# Patient Record
Sex: MALE | Race: WHITE | NOT HISPANIC OR LATINO | Employment: OTHER | URBAN - METROPOLITAN AREA
[De-identification: names, ages, dates, MRNs, and addresses within clinical notes are randomized per-mention and may not be internally consistent; named-entity substitution may affect disease eponyms.]

---

## 2014-09-23 LAB — CRC RECOMMENDATION EXT: NORMAL

## 2017-03-27 ENCOUNTER — HISTORICAL (OUTPATIENT)
Dept: ADMINISTRATIVE | Facility: HOSPITAL | Age: 67
End: 2017-03-27

## 2017-10-09 ENCOUNTER — TELEPHONE (OUTPATIENT)
Dept: UROLOGY | Facility: CLINIC | Age: 67
End: 2017-10-09

## 2017-10-09 NOTE — TELEPHONE ENCOUNTER
I spoke with patient, who agreed to new apt date and time, will bring his records and cd of his mri/ultrasound.

## 2017-10-09 NOTE — TELEPHONE ENCOUNTER
----- Message from Maxine Motta RN sent at 10/5/2017  1:15 PM CDT -----  Contact: pt#[phone]      ----- Message -----  From: Debbie Prado  Sent: 10/5/2017   1:04 PM  To: Italo ELLIOTT Staff    Pt wants to know if he can come in sooner for consult for prostate cancer

## 2017-10-12 ENCOUNTER — OFFICE VISIT (OUTPATIENT)
Dept: UROLOGY | Facility: CLINIC | Age: 67
End: 2017-10-12
Payer: MEDICARE

## 2017-10-12 VITALS
RESPIRATION RATE: 15 BRPM | HEIGHT: 69 IN | SYSTOLIC BLOOD PRESSURE: 131 MMHG | WEIGHT: 185 LBS | DIASTOLIC BLOOD PRESSURE: 65 MMHG | BODY MASS INDEX: 27.4 KG/M2 | HEART RATE: 65 BPM

## 2017-10-12 DIAGNOSIS — N48.6 PEYRONIE'S DISEASE: ICD-10-CM

## 2017-10-12 DIAGNOSIS — C61 PROSTATE CANCER: ICD-10-CM

## 2017-10-12 PROCEDURE — 99205 OFFICE O/P NEW HI 60 MIN: CPT | Mod: S$PBB,,, | Performed by: UROLOGY

## 2017-10-12 PROCEDURE — 99213 OFFICE O/P EST LOW 20 MIN: CPT | Mod: PBBFAC | Performed by: UROLOGY

## 2017-10-12 PROCEDURE — 99999 PR PBB SHADOW E&M-EST. PATIENT-LVL III: CPT | Mod: PBBFAC,,, | Performed by: UROLOGY

## 2017-10-12 RX ORDER — NIACINAMIDE 500 MG
500 TABLET ORAL
COMMUNITY
End: 2022-08-23

## 2017-10-12 RX ORDER — DUTASTERIDE AND TAMSULOSIN HYDROCHLORIDE CAPSULES .5; .4 MG/1; MG/1
CAPSULE ORAL
COMMUNITY
Start: 2017-10-11 | End: 2022-08-23

## 2017-10-12 RX ORDER — AMOXICILLIN 500 MG
2 CAPSULE ORAL DAILY
COMMUNITY
End: 2022-08-23

## 2017-10-12 RX ORDER — DOXYCYCLINE 100 MG/1
100 CAPSULE ORAL
COMMUNITY
End: 2022-08-23

## 2017-10-12 NOTE — LETTER
October 12, 2017      Rip Yen MD  [address] [facility] [address]           [facility] [place] [id number]  [place] [address]  Phone: [phone]          Patient: Matthew Mejia   MR Number: [id number]   YOB: 1950   Date of Visit: 10/12/2017       Dear Dr. Rip Yen:    Thank you for referring Matthew Mejia to me for evaluation. Attached you will find relevant portions of my assessment and plan of care.    If you have questions, please do not hesitate to call me. I look forward to following Matthew Mejia along with you.    Sincerely,    Yao Puckett MD    Enclosure  CC:  No Recipients    If you would like to receive this communication electronically, please contact [email or web address] or [phone] to request more information on [employer] Link access.    For providers and/or their staff who would like to refer a patient to [facility], please contact us through our one-stop-shop provider referral line, [facility], at [phone].    If you feel you have received this communication in error or would no longer like to receive these types of communications, please e-mail [email or web address]

## 2017-10-12 NOTE — PROGRESS NOTES
Clinic Note  10/12/2017      Subjective:         Chief Complaint:   HPI  Matthew Mejia is a 67 y.o. male recently diagnosed with prostate cancer. Consult from Dr. Yen.  Here with his wife Liza.  Retired from technology job.  Has some LUTS better on flomax and avodart, continent.  Potent, history of peyronies.      PSAi- 7.5 ( corrected 15)  Volume- 36 ccs (TRUS), 38 ccs (MRI)  Stage T3  Biopsy (9/21/2017) Right 15/25 positive [place] 8 10-30% cores positive; left 8/28 positive Ugo 8 and 4+3 8/28 positive.  MRI PIRADS 5 midline base/mid, positive EPE, positive SV, negative nodes.  EV score- 8  High Risk        Past Medical History:   Diagnosis Date    BPH (benign prostatic hyperplasia) 1994    Urinary tract infection      Family History   Problem Relation Age of Onset    Cancer Father     Cancer Mother     Heart disease Mother     Cancer Paternal Grandfather      Social History     Social History    Marital status:      Spouse name: Liza    Number of children: 2    Years of education: N/A     Occupational History    Not on file.     Social History Main Topics    Smoking status: Never Smoker    Smokeless tobacco: Never Used    Alcohol use 1.8 oz/week     3 Cans of beer per week    Drug use: No    Sexual activity: Yes     Partners: Female     Other Topics Concern    Not on file     Social History Narrative    No narrative on file     Past Surgical History:   Procedure Laterality Date    corpal tunnel  03/2017    SQUAMOUS CELL CARCINOMA EXCISION  04/11/2017    TRUS   2008    TRUS BX  09/20/2017     Patient Active Problem List   Diagnosis    Prostate cancer     Review of Systems   Constitutional: Negative for appetite change, chills, fatigue, fever and unexpected weight change.   HENT: Negative for nosebleeds.    Respiratory: Negative for shortness of breath and wheezing.    Cardiovascular: Negative for chest pain, palpitations and leg swelling.   Gastrointestinal: Negative  "for abdominal distention, abdominal pain, constipation, diarrhea, nausea and vomiting.   Genitourinary: Negative for dysuria and hematuria.   Musculoskeletal: Negative for arthralgias and back pain.   Skin: Negative for pallor.   Neurological: Negative for dizziness, seizures and syncope.   Hematological: Negative for adenopathy.   Psychiatric/Behavioral: Negative for dysphoric mood.         Objective:      /65   Pulse 65   Resp 15   Ht 5' 9" (1.753 m)   Wt 83.9 kg (185 lb)   BMI 27.32 kg/m²   Estimated body mass index is 27.32 kg/m² as calculated from the following:    Height as of this encounter: 5' 9" (1.753 m).    Weight as of this encounter: 83.9 kg (185 lb).  Physical Exam   Constitutional: He is oriented to person, place, and time. He appears well-developed and well-nourished. No distress.   HENT:   Head: Atraumatic.   Neck: No tracheal deviation present.   Cardiovascular: Normal rate.    Pulmonary/Chest: Effort normal. No respiratory distress. He has no wheezes.   Abdominal: Soft. Bowel sounds are normal. He exhibits no distension and no mass. There is no tenderness. There is no rebound and no guarding.   Genitourinary: Rectum normal. Rectal exam shows no external hemorrhoid, no internal hemorrhoid, no fissure, no mass and no tenderness.       Genitourinary Comments: T3C on exam, nonfixed   Neurological: He is alert and oriented to person, place, and time.   Skin: Skin is warm and dry. He is not diaphoretic.     Psychiatric: He has a normal mood and affect. His behavior is normal. Judgment and thought content normal.         Assessment and Plan:           Problem List Items Addressed This Visit     Prostate cancer      Other Visit Diagnoses    None.         Follow up:   Today's visit was spent almost entirely on counseling. We reviewed his diagnosis, stage, grade, risk group, and prognosis. We discussed [place] and EV risk stratification We reviewed the [place] nomogram. We discussed " the concept of low risk, moderate risk, and high risk disease. We discussed the different treatment options including active surveillance (as well as the surveillance protocol), prostate brachytherapy, IMRT, IGRT, cryotherapy, HIFU and both open and robotic prostatectomy.We also discussed the advantages, disadvantages, risks and benefits, as well as complications of each option. Regarding radiation therapy we discussed treatment planning, the different techniques, short and long term complications. These included radiation cystitis, radiation proctitis, and impotence. We discussed success, failure, and salvage therapeutic options. We discussed surgical therapy in depth including preoperative preparation, surgical technique (including bladder neck and nerve-sparing techniques), postoperative recuperation and recovery, and short and long term complications including UTI, bleeding, blood clots,catheter dislodgement, etc. We discussed the risks of reoperation, incontinence, impotence, and recurrence. We discussed preop and postop Kegels, post op penile rehab, and treatment options for incontinence and impotence. We discussed rates of cancer free survival and recurrence, as well as salvage therapeutic options. We discussed the possible  indications for adjuvant radiation therapy. I answered questions and addressed concerns.   Recommend bone scan to complete staging. Recommend ADT and XRT W/WO brachy boost.  Discussed fiducial markers, [place] criteria, follow up protocol.  Letter to Dr. Yen. Patient will contact Dr. Yen to schedule bone scan.  I spent 60 minutes with the patient of which more than half was spent in direct consultation with the patient in regards to our treatment and plan.     Yao Puckett

## 2018-05-11 ENCOUNTER — HISTORICAL (OUTPATIENT)
Dept: HEMATOLOGY/ONCOLOGY | Facility: CLINIC | Age: 68
End: 2018-05-11

## 2018-05-11 LAB
ABS NEUT (OLG): 2.89 X10(3)/MCL (ref 2.1–9.2)
ALBUMIN SERPL-MCNC: 4 GM/DL (ref 3.4–5)
ALBUMIN/GLOB SERPL: 1.7 RATIO (ref 1.1–2)
ALP SERPL-CCNC: 78 UNIT/L (ref 50–136)
ALT SERPL-CCNC: 63 UNIT/L (ref 12–78)
AST SERPL-CCNC: 31 UNIT/L (ref 15–37)
BASOPHILS # BLD AUTO: 0 X10(3)/MCL (ref 0–0.2)
BASOPHILS NFR BLD AUTO: 0.3 %
BILIRUB SERPL-MCNC: 0.6 MG/DL (ref 0.2–1)
BILIRUBIN DIRECT+TOT PNL SERPL-MCNC: 0.1 MG/DL (ref 0–0.5)
BILIRUBIN DIRECT+TOT PNL SERPL-MCNC: 0.5 MG/DL (ref 0–0.8)
BUN SERPL-MCNC: 21 MG/DL (ref 7–18)
CALCIUM SERPL-MCNC: 9.3 MG/DL (ref 8.5–10.1)
CHLORIDE SERPL-SCNC: 110 MMOL/L (ref 98–107)
CO2 SERPL-SCNC: 29 MMOL/L (ref 21–32)
CREAT SERPL-MCNC: 0.79 MG/DL (ref 0.7–1.3)
EOSINOPHIL # BLD AUTO: 0 X10(3)/MCL (ref 0–0.9)
EOSINOPHIL NFR BLD AUTO: 0.8 %
ERYTHROCYTE [DISTWIDTH] IN BLOOD BY AUTOMATED COUNT: 12.3 % (ref 11.5–17)
GLOBULIN SER-MCNC: 2.3 GM/DL (ref 2.4–3.5)
GLUCOSE SERPL-MCNC: 85 MG/DL (ref 74–106)
HCT VFR BLD AUTO: 37.9 % (ref 42–52)
HGB BLD-MCNC: 12.1 GM/DL (ref 14–18)
LYMPHOCYTES # BLD AUTO: 0.5 X10(3)/MCL (ref 0.6–4.6)
LYMPHOCYTES NFR BLD AUTO: 12.2 %
MCH RBC QN AUTO: 32.1 PG (ref 27–31)
MCHC RBC AUTO-ENTMCNC: 31.9 GM/DL (ref 33–36)
MCV RBC AUTO: 100.5 FL (ref 80–94)
MONOCYTES # BLD AUTO: 0.5 X10(3)/MCL (ref 0.1–1.3)
MONOCYTES NFR BLD AUTO: 13.7 %
NEUTROPHILS # BLD AUTO: 2.9 X10(3)/MCL (ref 2.1–9.2)
NEUTROPHILS NFR BLD AUTO: 73 %
PLATELET # BLD AUTO: 177 X10(3)/MCL (ref 130–400)
PMV BLD AUTO: 11.6 FL (ref 9.4–12.4)
POTASSIUM SERPL-SCNC: 4.3 MMOL/L (ref 3.5–5.1)
PROT SERPL-MCNC: 6.3 GM/DL (ref 6.4–8.2)
RBC # BLD AUTO: 3.77 X10(6)/MCL (ref 4.7–6.1)
SODIUM SERPL-SCNC: 145 MMOL/L (ref 136–145)
WBC # SPEC AUTO: 4 X10(3)/MCL (ref 4.5–11.5)

## 2018-08-16 ENCOUNTER — HISTORICAL (OUTPATIENT)
Dept: ADMINISTRATIVE | Facility: HOSPITAL | Age: 68
End: 2018-08-16

## 2018-08-16 LAB
ABS NEUT (OLG): 2.46 X10(3)/MCL (ref 2.1–9.2)
ALBUMIN SERPL-MCNC: 3.7 GM/DL (ref 3.4–5)
ALBUMIN/GLOB SERPL: 1.5 {RATIO}
ALP SERPL-CCNC: 71 UNIT/L (ref 50–136)
ALT SERPL-CCNC: 123 UNIT/L (ref 12–78)
AST SERPL-CCNC: 63 UNIT/L (ref 15–37)
BASOPHILS # BLD AUTO: 0 X10(3)/MCL (ref 0–0.2)
BASOPHILS NFR BLD AUTO: 0.3 %
BILIRUB SERPL-MCNC: 0.7 MG/DL (ref 0.2–1)
BILIRUBIN DIRECT+TOT PNL SERPL-MCNC: 0.2 MG/DL (ref 0–0.2)
BILIRUBIN DIRECT+TOT PNL SERPL-MCNC: 0.5 MG/DL (ref 0–0.8)
BUN SERPL-MCNC: 20 MG/DL (ref 7–18)
CALCIUM SERPL-MCNC: 8.8 MG/DL (ref 8.5–10.1)
CHLORIDE SERPL-SCNC: 109 MMOL/L (ref 98–107)
CO2 SERPL-SCNC: 26 MMOL/L (ref 21–32)
CREAT SERPL-MCNC: 0.72 MG/DL (ref 0.7–1.3)
EOSINOPHIL # BLD AUTO: 0 X10(3)/MCL (ref 0–0.9)
EOSINOPHIL NFR BLD AUTO: 0.3 %
ERYTHROCYTE [DISTWIDTH] IN BLOOD BY AUTOMATED COUNT: 14 % (ref 11.5–17)
GLOBULIN SER-MCNC: 2.5 GM/DL (ref 2.4–3.5)
GLUCOSE SERPL-MCNC: 81 MG/DL (ref 74–106)
HCT VFR BLD AUTO: 35.6 % (ref 42–52)
HGB BLD-MCNC: 11.4 GM/DL (ref 14–18)
LYMPHOCYTES # BLD AUTO: 0.6 X10(3)/MCL (ref 0.6–4.6)
LYMPHOCYTES NFR BLD AUTO: 17 %
MCH RBC QN AUTO: 31 PG (ref 27–31)
MCHC RBC AUTO-ENTMCNC: 32 GM/DL (ref 33–36)
MCV RBC AUTO: 96.7 FL (ref 80–94)
MONOCYTES # BLD AUTO: 0.6 X10(3)/MCL (ref 0.1–1.3)
MONOCYTES NFR BLD AUTO: 15.1 %
NEUTROPHILS # BLD AUTO: 2.5 X10(3)/MCL (ref 2.1–9.2)
NEUTROPHILS NFR BLD AUTO: 67.3 %
PLATELET # BLD AUTO: 173 X10(3)/MCL (ref 130–400)
PMV BLD AUTO: 11.1 FL (ref 9.4–12.4)
POTASSIUM SERPL-SCNC: 3.8 MMOL/L (ref 3.5–5.1)
PROT SERPL-MCNC: 6.2 GM/DL (ref 6.4–8.2)
PSA SERPL-MCNC: <0.01 NG/ML (ref 0–4)
RBC # BLD AUTO: 3.68 X10(6)/MCL (ref 4.7–6.1)
SODIUM SERPL-SCNC: 144 MMOL/L (ref 136–145)
WBC # SPEC AUTO: 3.6 X10(3)/MCL (ref 4.5–11.5)

## 2018-09-13 ENCOUNTER — HISTORICAL (OUTPATIENT)
Dept: ADMINISTRATIVE | Facility: HOSPITAL | Age: 68
End: 2018-09-13

## 2018-09-13 LAB — TSH SERPL-ACNC: 1.63 MIU/L (ref 0.36–3.74)

## 2018-11-15 ENCOUNTER — HISTORICAL (OUTPATIENT)
Dept: HEMATOLOGY/ONCOLOGY | Facility: CLINIC | Age: 68
End: 2018-11-15

## 2018-11-15 LAB
ABS NEUT (OLG): 2.94 X10(3)/MCL (ref 2.1–9.2)
ALBUMIN SERPL-MCNC: 3.6 GM/DL (ref 3.4–5)
ALBUMIN/GLOB SERPL: 1.4 {RATIO}
ALP SERPL-CCNC: 94 UNIT/L (ref 50–136)
ALT SERPL-CCNC: 37 UNIT/L (ref 12–78)
AST SERPL-CCNC: 20 UNIT/L (ref 15–37)
BASOPHILS # BLD AUTO: 0 X10(3)/MCL (ref 0–0.2)
BASOPHILS NFR BLD AUTO: 0.5 %
BILIRUB SERPL-MCNC: 0.5 MG/DL (ref 0.2–1)
BILIRUBIN DIRECT+TOT PNL SERPL-MCNC: 0.1 MG/DL (ref 0–0.2)
BILIRUBIN DIRECT+TOT PNL SERPL-MCNC: 0.4 MG/DL (ref 0–0.8)
BUN SERPL-MCNC: 19 MG/DL (ref 7–18)
CALCIUM SERPL-MCNC: 8.7 MG/DL (ref 8.5–10.1)
CHLORIDE SERPL-SCNC: 109 MMOL/L (ref 98–107)
CO2 SERPL-SCNC: 30 MMOL/L (ref 21–32)
CREAT SERPL-MCNC: 0.8 MG/DL (ref 0.7–1.3)
EOSINOPHIL # BLD AUTO: 0 X10(3)/MCL (ref 0–0.9)
EOSINOPHIL NFR BLD AUTO: 0.5 %
ERYTHROCYTE [DISTWIDTH] IN BLOOD BY AUTOMATED COUNT: 12.5 % (ref 11.5–17)
GLOBULIN SER-MCNC: 2.6 GM/DL (ref 2.4–3.5)
GLUCOSE SERPL-MCNC: 78 MG/DL (ref 74–106)
HCT VFR BLD AUTO: 38.8 % (ref 42–52)
HGB BLD-MCNC: 12 GM/DL (ref 14–18)
LYMPHOCYTES # BLD AUTO: 0.6 X10(3)/MCL (ref 0.6–4.6)
LYMPHOCYTES NFR BLD AUTO: 13.5 %
MCH RBC QN AUTO: 30.7 PG (ref 27–31)
MCHC RBC AUTO-ENTMCNC: 30.9 GM/DL (ref 33–36)
MCV RBC AUTO: 99.2 FL (ref 80–94)
MONOCYTES # BLD AUTO: 0.6 X10(3)/MCL (ref 0.1–1.3)
MONOCYTES NFR BLD AUTO: 14.7 %
NEUTROPHILS # BLD AUTO: 2.9 X10(3)/MCL (ref 2.1–9.2)
NEUTROPHILS NFR BLD AUTO: 70.8 %
PLATELET # BLD AUTO: 182 X10(3)/MCL (ref 130–400)
PMV BLD AUTO: 10.7 FL (ref 9.4–12.4)
POTASSIUM SERPL-SCNC: 4 MMOL/L (ref 3.5–5.1)
PROT SERPL-MCNC: 6.2 GM/DL (ref 6.4–8.2)
PSA SERPL-MCNC: <0.01 NG/ML (ref 0–4)
RBC # BLD AUTO: 3.91 X10(6)/MCL (ref 4.7–6.1)
SODIUM SERPL-SCNC: 143 MMOL/L (ref 136–145)
WBC # SPEC AUTO: 4.2 X10(3)/MCL (ref 4.5–11.5)

## 2019-03-15 ENCOUNTER — HISTORICAL (OUTPATIENT)
Dept: HEMATOLOGY/ONCOLOGY | Facility: CLINIC | Age: 69
End: 2019-03-15

## 2019-03-15 LAB
ABS NEUT (OLG): 2.16 X10(3)/MCL (ref 2.1–9.2)
ALBUMIN SERPL-MCNC: 3.7 GM/DL (ref 3.4–5)
ALBUMIN/GLOB SERPL: 1.5 {RATIO}
ALP SERPL-CCNC: 80 UNIT/L (ref 50–136)
ALT SERPL-CCNC: 26 UNIT/L (ref 12–78)
ANISOCYTOSIS BLD QL SMEAR: 0
AST SERPL-CCNC: 17 UNIT/L (ref 15–37)
BASOPHILS # BLD AUTO: 0 X10(3)/MCL (ref 0–0.2)
BASOPHILS NFR BLD AUTO: 0.5 %
BASOPHILS NFR BLD MANUAL: 1 % (ref 0–2)
BILIRUB SERPL-MCNC: 0.6 MG/DL (ref 0.2–1)
BILIRUBIN DIRECT+TOT PNL SERPL-MCNC: 0.1 MG/DL (ref 0–0.2)
BILIRUBIN DIRECT+TOT PNL SERPL-MCNC: 0.5 MG/DL (ref 0–0.8)
BUN SERPL-MCNC: 17 MG/DL (ref 7–18)
CALCIUM SERPL-MCNC: 8.4 MG/DL (ref 8.5–10.1)
CHLORIDE SERPL-SCNC: 107 MMOL/L (ref 98–107)
CO2 SERPL-SCNC: 29 MMOL/L (ref 21–32)
CREAT SERPL-MCNC: 0.83 MG/DL (ref 0.7–1.3)
EOSINOPHIL # BLD AUTO: 0 X10(3)/MCL (ref 0–0.9)
EOSINOPHIL NFR BLD AUTO: 0.5 %
ERYTHROCYTE [DISTWIDTH] IN BLOOD BY AUTOMATED COUNT: 12.9 % (ref 11.5–17)
GLOBULIN SER-MCNC: 2.4 GM/DL (ref 2.4–3.5)
GLUCOSE SERPL-MCNC: 71 MG/DL (ref 74–106)
HCT VFR BLD AUTO: 36.2 % (ref 42–52)
HGB BLD-MCNC: 11.7 GM/DL (ref 14–18)
HYPOCHROMIA BLD QL SMEAR: 0
LYMPHOCYTES # BLD AUTO: 0.8 X10(3)/MCL (ref 0.6–4.6)
LYMPHOCYTES NFR BLD AUTO: 21.7 %
LYMPHOCYTES NFR BLD MANUAL: 17 % (ref 13–40)
MACROCYTES BLD QL SMEAR: 0
MCH RBC QN AUTO: 30.8 PG (ref 27–31)
MCHC RBC AUTO-ENTMCNC: 32.3 GM/DL (ref 33–36)
MCV RBC AUTO: 95.3 FL (ref 80–94)
MICROCYTES BLD QL SMEAR: 0
MONOCYTES # BLD AUTO: 0.7 X10(3)/MCL (ref 0.1–1.3)
MONOCYTES NFR BLD AUTO: 18.5 %
MONOCYTES NFR BLD MANUAL: 19 % (ref 2–11)
NEUTROPHILS # BLD AUTO: 2.2 X10(3)/MCL (ref 2.1–9.2)
NEUTROPHILS NFR BLD AUTO: 58.8 %
NEUTROPHILS NFR BLD MANUAL: 63 % (ref 47–80)
PLATELET # BLD AUTO: 189 X10(3)/MCL (ref 130–400)
PLATELET # BLD EST: NORMAL 10*3/UL
PMV BLD AUTO: 11.1 FL (ref 9.4–12.4)
POIKILOCYTOSIS BLD QL SMEAR: 0
POLYCHROMASIA BLD QL SMEAR: 0
POTASSIUM SERPL-SCNC: 3.6 MMOL/L (ref 3.5–5.1)
PROT SERPL-MCNC: 6.1 GM/DL (ref 6.4–8.2)
PSA SERPL-MCNC: <0.01 NG/ML (ref 0–4)
RBC # BLD AUTO: 3.8 X10(6)/MCL (ref 4.7–6.1)
SODIUM SERPL-SCNC: 143 MMOL/L (ref 136–145)
WBC # SPEC AUTO: 3.7 X10(3)/MCL (ref 4.5–11.5)

## 2019-07-22 ENCOUNTER — HISTORICAL (OUTPATIENT)
Dept: HEMATOLOGY/ONCOLOGY | Facility: CLINIC | Age: 69
End: 2019-07-22

## 2019-07-22 LAB
ABS NEUT (OLG): 1.98 X10(3)/MCL (ref 2.1–9.2)
ALBUMIN SERPL-MCNC: 4 GM/DL (ref 3.4–5)
ALBUMIN/GLOB SERPL: 1.9 {RATIO}
ALP SERPL-CCNC: 93 UNIT/L (ref 50–136)
ALT SERPL-CCNC: 40 UNIT/L (ref 12–78)
AST SERPL-CCNC: 31 UNIT/L (ref 15–37)
BASOPHILS # BLD AUTO: 0 X10(3)/MCL (ref 0–0.2)
BASOPHILS NFR BLD AUTO: 0.3 %
BILIRUB SERPL-MCNC: 0.7 MG/DL (ref 0.2–1)
BILIRUBIN DIRECT+TOT PNL SERPL-MCNC: 0.2 MG/DL (ref 0–0.2)
BILIRUBIN DIRECT+TOT PNL SERPL-MCNC: 0.5 MG/DL (ref 0–0.8)
BUN SERPL-MCNC: 15 MG/DL (ref 7–18)
CALCIUM SERPL-MCNC: 9.1 MG/DL (ref 8.5–10.1)
CHLORIDE SERPL-SCNC: 110 MMOL/L (ref 98–107)
CO2 SERPL-SCNC: 26 MMOL/L (ref 21–32)
CREAT SERPL-MCNC: 0.72 MG/DL (ref 0.7–1.3)
EOSINOPHIL # BLD AUTO: 0 X10(3)/MCL (ref 0–0.9)
EOSINOPHIL NFR BLD AUTO: 1.2 %
EOSINOPHIL NFR BLD MANUAL: 1 % (ref 0–8)
ERYTHROCYTE [DISTWIDTH] IN BLOOD BY AUTOMATED COUNT: 13 % (ref 11.5–17)
GLOBULIN SER-MCNC: 2.1 GM/DL (ref 2.4–3.5)
GLUCOSE SERPL-MCNC: 87 MG/DL (ref 74–106)
HCT VFR BLD AUTO: 36.9 % (ref 42–52)
HGB BLD-MCNC: 11.9 GM/DL (ref 14–18)
HYPOCHROMIA BLD QL SMEAR: 0
LYMPHOCYTES # BLD AUTO: 0.7 X10(3)/MCL (ref 0.6–4.6)
LYMPHOCYTES NFR BLD AUTO: 22.1 %
LYMPHOCYTES NFR BLD MANUAL: 20 % (ref 13–40)
MACROCYTES BLD QL SMEAR: 0
MCH RBC QN AUTO: 31.2 PG (ref 27–31)
MCHC RBC AUTO-ENTMCNC: 32.2 GM/DL (ref 33–36)
MCV RBC AUTO: 96.9 FL (ref 80–94)
MICROCYTES BLD QL SMEAR: 0
MONOCYTES # BLD AUTO: 0.5 X10(3)/MCL (ref 0.1–1.3)
MONOCYTES NFR BLD AUTO: 16.3 %
MONOCYTES NFR BLD MANUAL: 10 % (ref 2–11)
NEUTROPHILS # BLD AUTO: 2 X10(3)/MCL (ref 2.1–9.2)
NEUTROPHILS NFR BLD AUTO: 59.8 %
NEUTROPHILS NFR BLD MANUAL: 69 % (ref 47–80)
PLATELET # BLD AUTO: 182 X10(3)/MCL (ref 130–400)
PLATELET # BLD EST: NORMAL 10*3/UL
PMV BLD AUTO: 11 FL (ref 9.4–12.4)
POIKILOCYTOSIS BLD QL SMEAR: 0
POLYCHROMASIA BLD QL SMEAR: 0
POTASSIUM SERPL-SCNC: 3.7 MMOL/L (ref 3.5–5.1)
PROT SERPL-MCNC: 6.1 GM/DL (ref 6.4–8.2)
PSA SERPL-MCNC: <0.01 NG/ML (ref 0–4)
RBC # BLD AUTO: 3.81 X10(6)/MCL (ref 4.7–6.1)
SODIUM SERPL-SCNC: 145 MMOL/L (ref 136–145)
WBC # SPEC AUTO: 3.3 X10(3)/MCL (ref 4.5–11.5)

## 2019-11-12 ENCOUNTER — HISTORICAL (OUTPATIENT)
Dept: HEMATOLOGY/ONCOLOGY | Facility: CLINIC | Age: 69
End: 2019-11-12

## 2019-11-12 LAB
ABS NEUT (OLG): 2.38 X10(3)/MCL (ref 2.1–9.2)
ALBUMIN SERPL-MCNC: 4 GM/DL (ref 3.4–5)
ALBUMIN/GLOB SERPL: 1.7 {RATIO}
ALP SERPL-CCNC: 97 UNIT/L (ref 50–136)
ALT SERPL-CCNC: 28 UNIT/L (ref 12–78)
AST SERPL-CCNC: 15 UNIT/L (ref 15–37)
BASOPHILS # BLD AUTO: 0 X10(3)/MCL (ref 0–0.2)
BASOPHILS NFR BLD AUTO: 0.3 %
BILIRUB SERPL-MCNC: 0.9 MG/DL (ref 0.2–1)
BILIRUBIN DIRECT+TOT PNL SERPL-MCNC: 0.2 MG/DL (ref 0–0.2)
BILIRUBIN DIRECT+TOT PNL SERPL-MCNC: 0.7 MG/DL (ref 0–0.8)
BUN SERPL-MCNC: 16 MG/DL (ref 7–18)
CALCIUM SERPL-MCNC: 9.4 MG/DL (ref 8.5–10.1)
CHLORIDE SERPL-SCNC: 108 MMOL/L (ref 98–107)
CO2 SERPL-SCNC: 29 MMOL/L (ref 21–32)
CREAT SERPL-MCNC: 0.89 MG/DL (ref 0.7–1.3)
EOSINOPHIL # BLD AUTO: 0 X10(3)/MCL (ref 0–0.9)
EOSINOPHIL NFR BLD AUTO: 1 %
ERYTHROCYTE [DISTWIDTH] IN BLOOD BY AUTOMATED COUNT: 13.1 % (ref 11.5–17)
GLOBULIN SER-MCNC: 2.4 GM/DL (ref 2.4–3.5)
GLUCOSE SERPL-MCNC: 89 MG/DL (ref 74–106)
HCT VFR BLD AUTO: 38.9 % (ref 42–52)
HGB BLD-MCNC: 12.4 GM/DL (ref 14–18)
LYMPHOCYTES # BLD AUTO: 0.9 X10(3)/MCL (ref 0.6–4.6)
LYMPHOCYTES NFR BLD AUTO: 22.4 %
LYMPHOCYTES NFR BLD MANUAL: 23 % (ref 13–40)
MCH RBC QN AUTO: 31 PG (ref 27–31)
MCHC RBC AUTO-ENTMCNC: 31.9 GM/DL (ref 33–36)
MCV RBC AUTO: 97.3 FL (ref 80–94)
MONOCYTES # BLD AUTO: 0.6 X10(3)/MCL (ref 0.1–1.3)
MONOCYTES NFR BLD AUTO: 16.3 %
MONOCYTES NFR BLD MANUAL: 19 % (ref 2–11)
NEUTROPHILS # BLD AUTO: 2.4 X10(3)/MCL (ref 2.1–9.2)
NEUTROPHILS NFR BLD AUTO: 59.7 %
NEUTROPHILS NFR BLD MANUAL: 58 % (ref 47–80)
PLATELET # BLD AUTO: 217 X10(3)/MCL (ref 130–400)
PLATELET # BLD EST: NORMAL 10*3/UL
PMV BLD AUTO: 11.1 FL (ref 9.4–12.4)
POTASSIUM SERPL-SCNC: 4.4 MMOL/L (ref 3.5–5.1)
PROT SERPL-MCNC: 6.4 GM/DL (ref 6.4–8.2)
PSA SERPL-MCNC: <0.01 NG/ML (ref 0–4)
RBC # BLD AUTO: 4 X10(6)/MCL (ref 4.7–6.1)
RBC MORPH BLD: NORMAL
SODIUM SERPL-SCNC: 142 MMOL/L (ref 136–145)
WBC # SPEC AUTO: 4 X10(3)/MCL (ref 4.5–11.5)

## 2020-01-02 ENCOUNTER — HISTORICAL (OUTPATIENT)
Dept: ADMINISTRATIVE | Facility: HOSPITAL | Age: 70
End: 2020-01-02

## 2020-01-02 LAB
ABS NEUT (OLG): 4.35 X10(3)/MCL (ref 2.1–9.2)
ALBUMIN SERPL-MCNC: 3.8 GM/DL (ref 3.4–5)
ALBUMIN/GLOB SERPL: 1.5 {RATIO}
ALP SERPL-CCNC: 92 UNIT/L (ref 50–136)
ALT SERPL-CCNC: 24 UNIT/L (ref 12–78)
AST SERPL-CCNC: 14 UNIT/L (ref 15–37)
BASOPHILS # BLD AUTO: 0 X10(3)/MCL (ref 0–0.2)
BASOPHILS NFR BLD AUTO: 0 %
BILIRUB SERPL-MCNC: 0.4 MG/DL (ref 0.2–1)
BILIRUBIN DIRECT+TOT PNL SERPL-MCNC: 0.1 MG/DL (ref 0–0.2)
BILIRUBIN DIRECT+TOT PNL SERPL-MCNC: 0.3 MG/DL (ref 0–0.8)
BUN SERPL-MCNC: 19 MG/DL (ref 7–18)
CALCIUM SERPL-MCNC: 9.4 MG/DL (ref 8.5–10.1)
CHLORIDE SERPL-SCNC: 111 MMOL/L (ref 98–107)
CO2 SERPL-SCNC: 28 MMOL/L (ref 21–32)
CREAT SERPL-MCNC: 0.86 MG/DL (ref 0.7–1.3)
EOSINOPHIL # BLD AUTO: 0 X10(3)/MCL (ref 0–0.9)
EOSINOPHIL NFR BLD AUTO: 1 %
ERYTHROCYTE [DISTWIDTH] IN BLOOD BY AUTOMATED COUNT: 12.7 % (ref 11.5–17)
GLOBULIN SER-MCNC: 2.5 GM/DL (ref 2.4–3.5)
GLUCOSE SERPL-MCNC: 94 MG/DL (ref 74–106)
HCT VFR BLD AUTO: 39.2 % (ref 42–52)
HGB BLD-MCNC: 12.3 GM/DL (ref 14–18)
LYMPHOCYTES # BLD AUTO: 0.8 X10(3)/MCL (ref 0.6–4.6)
LYMPHOCYTES NFR BLD AUTO: 13 %
MCH RBC QN AUTO: 30.7 PG (ref 27–31)
MCHC RBC AUTO-ENTMCNC: 31.4 GM/DL (ref 33–36)
MCV RBC AUTO: 97.8 FL (ref 80–94)
MONOCYTES # BLD AUTO: 0.7 X10(3)/MCL (ref 0.1–1.3)
MONOCYTES NFR BLD AUTO: 12 %
NEUTROPHILS # BLD AUTO: 4.35 X10(3)/MCL (ref 2.1–9.2)
NEUTROPHILS NFR BLD AUTO: 74 %
PLATELET # BLD AUTO: 204 X10(3)/MCL (ref 130–400)
PMV BLD AUTO: 11 FL (ref 9.4–12.4)
POTASSIUM SERPL-SCNC: 4.5 MMOL/L (ref 3.5–5.1)
PROT SERPL-MCNC: 6.3 GM/DL (ref 6.4–8.2)
RBC # BLD AUTO: 4.01 X10(6)/MCL (ref 4.7–6.1)
SODIUM SERPL-SCNC: 144 MMOL/L (ref 136–145)
TSH SERPL-ACNC: 1.49 MIU/L (ref 0.36–3.74)
WBC # SPEC AUTO: 5.9 X10(3)/MCL (ref 4.5–11.5)

## 2020-01-09 ENCOUNTER — HISTORICAL (OUTPATIENT)
Dept: LAB | Facility: HOSPITAL | Age: 70
End: 2020-01-09

## 2020-01-09 LAB
APPEARANCE, UA: CLEAR
BACTERIA SPEC CULT: NORMAL /HPF
BILIRUB UR QL STRIP: NEGATIVE
COLOR UR: YELLOW
GLUCOSE (UA): NEGATIVE
HGB UR QL STRIP: NEGATIVE
KETONES UR QL STRIP: NEGATIVE
LEUKOCYTE ESTERASE UR QL STRIP: NEGATIVE
NITRITE UR QL STRIP: NEGATIVE
PH UR STRIP: 5.5 [PH] (ref 5–9)
PROT UR QL STRIP: NEGATIVE
RBC #/AREA URNS HPF: NORMAL /[HPF]
SP GR UR STRIP: 1.01 (ref 1–1.03)
SQUAMOUS EPITHELIAL, UA: NORMAL
UROBILINOGEN UR STRIP-ACNC: 0.2
WBC #/AREA URNS HPF: NORMAL /HPF

## 2020-03-12 ENCOUNTER — HISTORICAL (OUTPATIENT)
Dept: HEMATOLOGY/ONCOLOGY | Facility: CLINIC | Age: 70
End: 2020-03-12

## 2020-03-12 LAB
ALBUMIN SERPL-MCNC: 3.7 GM/DL (ref 3.4–5)
ALBUMIN/GLOB SERPL: 1.3 RATIO (ref 1.1–2)
ALP SERPL-CCNC: 89 UNIT/L (ref 45–117)
ALT SERPL-CCNC: 29 UNIT/L (ref 12–78)
AST SERPL-CCNC: 20 UNIT/L (ref 15–37)
BILIRUB SERPL-MCNC: 0.4 MG/DL (ref 0.2–1)
BILIRUBIN DIRECT+TOT PNL SERPL-MCNC: 0.1 MG/DL (ref 0–0.2)
BILIRUBIN DIRECT+TOT PNL SERPL-MCNC: 0.3 MG/DL
BUN SERPL-MCNC: 18 MG/DL (ref 7–18)
CALCIUM SERPL-MCNC: 9.4 MG/DL (ref 8.5–10.1)
CHLORIDE SERPL-SCNC: 110 MMOL/L (ref 98–107)
CO2 SERPL-SCNC: 27 MMOL/L (ref 21–32)
CREAT SERPL-MCNC: 0.81 MG/DL (ref 0.6–1.3)
GLOBULIN SER-MCNC: 2.8 GM/ML (ref 2.3–3.5)
GLUCOSE SERPL-MCNC: 94 MG/DL (ref 74–106)
POTASSIUM SERPL-SCNC: 4.4 MMOL/L (ref 3.5–5.1)
PROT SERPL-MCNC: 6.5 GM/DL (ref 6.4–8.2)
PSA SERPL-MCNC: <0.1 NG/ML
SODIUM SERPL-SCNC: 141 MMOL/L (ref 136–145)

## 2020-05-29 ENCOUNTER — HISTORICAL (OUTPATIENT)
Dept: CARDIOLOGY | Facility: HOSPITAL | Age: 70
End: 2020-05-29

## 2020-06-16 ENCOUNTER — HISTORICAL (OUTPATIENT)
Dept: HEMATOLOGY/ONCOLOGY | Facility: CLINIC | Age: 70
End: 2020-06-16

## 2020-06-16 LAB
ALBUMIN SERPL-MCNC: 4.2 GM/DL (ref 3.4–5)
ALBUMIN/GLOB SERPL: 1.8 RATIO (ref 1.1–2)
ALP SERPL-CCNC: 103 UNIT/L (ref 40–150)
ALT SERPL-CCNC: 18 UNIT/L (ref 0–55)
AST SERPL-CCNC: 19 UNIT/L (ref 5–34)
BILIRUB SERPL-MCNC: 0.7 MG/DL
BILIRUBIN DIRECT+TOT PNL SERPL-MCNC: 0.2 MG/DL (ref 0–0.5)
BILIRUBIN DIRECT+TOT PNL SERPL-MCNC: 0.5 MG/DL (ref 0–0.8)
BUN SERPL-MCNC: 16.4 MG/DL (ref 8.4–25.7)
CALCIUM SERPL-MCNC: 9.1 MG/DL (ref 8.8–10)
CHLORIDE SERPL-SCNC: 106 MMOL/L (ref 98–107)
CO2 SERPL-SCNC: 25 MMOL/L (ref 23–31)
CREAT SERPL-MCNC: 0.86 MG/DL (ref 0.73–1.18)
GLOBULIN SER-MCNC: 2.3 GM/DL (ref 2.4–3.5)
GLUCOSE SERPL-MCNC: 105 MG/DL (ref 82–115)
POTASSIUM SERPL-SCNC: 4.7 MMOL/L (ref 3.5–5.1)
PROT SERPL-MCNC: 6.5 GM/DL (ref 5.8–7.6)
PSA SERPL-MCNC: <0.02 NG/ML
SODIUM SERPL-SCNC: 141 MMOL/L (ref 136–145)

## 2020-09-16 ENCOUNTER — HISTORICAL (OUTPATIENT)
Dept: ADMINISTRATIVE | Facility: HOSPITAL | Age: 70
End: 2020-09-16

## 2020-09-16 LAB
ABS NEUT (OLG): 1.9 X10(3)/MCL (ref 2.1–9.2)
ALBUMIN SERPL-MCNC: 4 GM/DL (ref 3.4–5)
ALBUMIN/GLOB SERPL: 1.9 RATIO (ref 1.1–2)
ALP SERPL-CCNC: 95 UNIT/L (ref 40–150)
ALT SERPL-CCNC: 19 UNIT/L (ref 0–55)
AST SERPL-CCNC: 20 UNIT/L (ref 5–34)
BASOPHILS # BLD AUTO: 0 X10(3)/MCL (ref 0–0.2)
BASOPHILS NFR BLD AUTO: 1 %
BILIRUB SERPL-MCNC: 0.7 MG/DL
BILIRUBIN DIRECT+TOT PNL SERPL-MCNC: 0.2 MG/DL (ref 0–0.5)
BILIRUBIN DIRECT+TOT PNL SERPL-MCNC: 0.5 MG/DL (ref 0–0.8)
BUN SERPL-MCNC: 13.7 MG/DL (ref 8.4–25.7)
CALCIUM SERPL-MCNC: 8.6 MG/DL (ref 8.8–10)
CHLORIDE SERPL-SCNC: 110 MMOL/L (ref 98–107)
CO2 SERPL-SCNC: 29 MMOL/L (ref 23–31)
CREAT SERPL-MCNC: 0.84 MG/DL (ref 0.73–1.18)
EOSINOPHIL # BLD AUTO: 0 X10(3)/MCL (ref 0–0.9)
EOSINOPHIL NFR BLD AUTO: 1 %
ERYTHROCYTE [DISTWIDTH] IN BLOOD BY AUTOMATED COUNT: 13.5 % (ref 11.5–17)
GLOBULIN SER-MCNC: 2.1 GM/DL (ref 2.4–3.5)
GLUCOSE SERPL-MCNC: 103 MG/DL (ref 82–115)
HCT VFR BLD AUTO: 39.5 % (ref 42–52)
HGB BLD-MCNC: 12.7 GM/DL (ref 14–18)
LYMPHOCYTES # BLD AUTO: 0.7 X10(3)/MCL (ref 0.6–4.6)
LYMPHOCYTES NFR BLD AUTO: 23 %
MCH RBC QN AUTO: 30.9 PG (ref 27–31)
MCHC RBC AUTO-ENTMCNC: 32.2 GM/DL (ref 33–36)
MCV RBC AUTO: 96.1 FL (ref 80–94)
MONOCYTES # BLD AUTO: 0.4 X10(3)/MCL (ref 0.1–1.3)
MONOCYTES NFR BLD AUTO: 14 %
NEUTROPHILS # BLD AUTO: 1.9 X10(3)/MCL (ref 2.1–9.2)
NEUTROPHILS NFR BLD AUTO: 62 %
PLATELET # BLD AUTO: 213 X10(3)/MCL (ref 130–400)
PMV BLD AUTO: 11.3 FL (ref 9.4–12.4)
POTASSIUM SERPL-SCNC: 4.8 MMOL/L (ref 3.5–5.1)
PROT SERPL-MCNC: 6.1 GM/DL (ref 5.8–7.6)
RBC # BLD AUTO: 4.11 X10(6)/MCL (ref 4.7–6.1)
SODIUM SERPL-SCNC: 142 MMOL/L (ref 136–145)
TSH SERPL-ACNC: 1.01 UIU/ML (ref 0.35–4.94)
WBC # SPEC AUTO: 3.1 X10(3)/MCL (ref 4.5–11.5)

## 2020-09-17 ENCOUNTER — HISTORICAL (OUTPATIENT)
Dept: CARDIOLOGY | Facility: HOSPITAL | Age: 70
End: 2020-09-17

## 2020-11-04 ENCOUNTER — HISTORICAL (OUTPATIENT)
Dept: HEMATOLOGY/ONCOLOGY | Facility: CLINIC | Age: 70
End: 2020-11-04

## 2020-11-04 LAB — PSA SERPL-MCNC: 0.07 NG/ML

## 2021-04-06 ENCOUNTER — HISTORICAL (OUTPATIENT)
Dept: ADMINISTRATIVE | Facility: HOSPITAL | Age: 71
End: 2021-04-06

## 2021-04-06 LAB
ABS NEUT (OLG): 2.14 X10(3)/MCL (ref 2.1–9.2)
ALBUMIN SERPL-MCNC: 4.3 GM/DL (ref 3.4–4.8)
ALBUMIN/GLOB SERPL: 2 RATIO (ref 1.1–2)
ALP SERPL-CCNC: 81 UNIT/L (ref 40–150)
ALT SERPL-CCNC: 18 UNIT/L (ref 0–55)
APPEARANCE, UA: CLEAR
AST SERPL-CCNC: 24 UNIT/L (ref 5–34)
BACTERIA SPEC CULT: NORMAL /HPF
BASOPHILS # BLD AUTO: 0 X10(3)/MCL (ref 0–0.2)
BASOPHILS NFR BLD AUTO: 1 %
BILIRUB SERPL-MCNC: 0.5 MG/DL
BILIRUB UR QL STRIP: NEGATIVE
BILIRUBIN DIRECT+TOT PNL SERPL-MCNC: 0.2 MG/DL (ref 0–0.5)
BILIRUBIN DIRECT+TOT PNL SERPL-MCNC: 0.3 MG/DL (ref 0–0.8)
BUN SERPL-MCNC: 16.4 MG/DL (ref 8.4–25.7)
CALCIUM SERPL-MCNC: 9.5 MG/DL (ref 8.8–10)
CHLORIDE SERPL-SCNC: 110 MMOL/L (ref 98–107)
CHOLEST SERPL-MCNC: 188 MG/DL
CHOLEST/HDLC SERPL: 3 {RATIO} (ref 0–5)
CO2 SERPL-SCNC: 27 MMOL/L (ref 23–31)
COLOR UR: YELLOW
CREAT SERPL-MCNC: 0.81 MG/DL (ref 0.73–1.18)
EOSINOPHIL # BLD AUTO: 0 X10(3)/MCL (ref 0–0.9)
EOSINOPHIL NFR BLD AUTO: 1 %
ERYTHROCYTE [DISTWIDTH] IN BLOOD BY AUTOMATED COUNT: 14.2 % (ref 11.5–17)
GLOBULIN SER-MCNC: 2.1 GM/DL (ref 2.4–3.5)
GLUCOSE (UA): NEGATIVE
GLUCOSE SERPL-MCNC: 96 MG/DL (ref 82–115)
HCT VFR BLD AUTO: 40.7 % (ref 42–52)
HDLC SERPL-MCNC: 64 MG/DL (ref 35–60)
HGB BLD-MCNC: 13 GM/DL (ref 14–18)
HGB UR QL STRIP: NEGATIVE
KETONES UR QL STRIP: NEGATIVE
LDLC SERPL CALC-MCNC: 114 MG/DL (ref 50–140)
LEUKOCYTE ESTERASE UR QL STRIP: NEGATIVE
LYMPHOCYTES # BLD AUTO: 0.6 X10(3)/MCL (ref 0.6–4.6)
LYMPHOCYTES NFR BLD AUTO: 20 %
MCH RBC QN AUTO: 31 PG (ref 27–31)
MCHC RBC AUTO-ENTMCNC: 31.9 GM/DL (ref 33–36)
MCV RBC AUTO: 97.1 FL (ref 80–94)
MONOCYTES # BLD AUTO: 0.5 X10(3)/MCL (ref 0.1–1.3)
MONOCYTES NFR BLD AUTO: 14 %
NEUTROPHILS # BLD AUTO: 2.14 X10(3)/MCL (ref 2.1–9.2)
NEUTROPHILS NFR BLD AUTO: 65 %
NITRITE UR QL STRIP: NEGATIVE
PH UR STRIP: 5 [PH] (ref 5–9)
PLATELET # BLD AUTO: 212 X10(3)/MCL (ref 130–400)
PMV BLD AUTO: 12.2 FL (ref 9.4–12.4)
POTASSIUM SERPL-SCNC: 4.7 MMOL/L (ref 3.5–5.1)
PROT SERPL-MCNC: 6.4 GM/DL (ref 5.8–7.6)
PROT UR QL STRIP: NEGATIVE
RBC # BLD AUTO: 4.19 X10(6)/MCL (ref 4.7–6.1)
RBC #/AREA URNS HPF: NORMAL /[HPF]
SODIUM SERPL-SCNC: 143 MMOL/L (ref 136–145)
SP GR UR STRIP: 1.02 (ref 1–1.03)
SQUAMOUS EPITHELIAL, UA: NORMAL /HPF (ref 0–4)
TRIGL SERPL-MCNC: 51 MG/DL (ref 34–140)
TSH SERPL-ACNC: 1.34 UIU/ML (ref 0.35–4.94)
UROBILINOGEN UR STRIP-ACNC: 0.2
VLDLC SERPL CALC-MCNC: 10 MG/DL
WBC # SPEC AUTO: 3.3 X10(3)/MCL (ref 4.5–11.5)
WBC #/AREA URNS HPF: NORMAL /HPF

## 2022-04-06 ENCOUNTER — HISTORICAL (OUTPATIENT)
Dept: ADMINISTRATIVE | Facility: HOSPITAL | Age: 72
End: 2022-04-06

## 2022-04-06 LAB
ABS NEUT (OLG): 2.91 (ref 2.1–9.2)
ALBUMIN SERPL-MCNC: 3.9 G/DL (ref 3.4–4.8)
ALBUMIN/GLOB SERPL: 1.6 {RATIO} (ref 1.1–2)
ALP SERPL-CCNC: 52 U/L (ref 40–150)
ALT SERPL-CCNC: 17 U/L (ref 0–55)
AST SERPL-CCNC: 22 U/L (ref 5–34)
BASOPHILS # BLD AUTO: 0 10*3/UL (ref 0–0.2)
BASOPHILS NFR BLD AUTO: 0 %
BILIRUB SERPL-MCNC: 0.6 MG/DL
BILIRUBIN DIRECT+TOT PNL SERPL-MCNC: 0.2 (ref 0–0.5)
BILIRUBIN DIRECT+TOT PNL SERPL-MCNC: 0.4 (ref 0–0.8)
BUN SERPL-MCNC: 17.5 MG/DL (ref 8.4–25.7)
CALCIUM SERPL-MCNC: 9.1 MG/DL (ref 8.7–10.5)
CHLORIDE SERPL-SCNC: 106 MMOL/L (ref 98–107)
CHOLEST SERPL-MCNC: 212 MG/DL
CHOLEST/HDLC SERPL: 3 {RATIO} (ref 0–5)
CO2 SERPL-SCNC: 25 MMOL/L (ref 23–31)
CREAT SERPL-MCNC: 0.92 MG/DL (ref 0.73–1.18)
EOSINOPHIL # BLD AUTO: 0 10*3/UL (ref 0–0.9)
EOSINOPHIL NFR BLD AUTO: 1 %
ERYTHROCYTE [DISTWIDTH] IN BLOOD BY AUTOMATED COUNT: 14 % (ref 11.5–17)
GLOBULIN SER-MCNC: 2.4 G/DL (ref 2.4–3.5)
GLUCOSE SERPL-MCNC: 87 MG/DL (ref 82–115)
HCT VFR BLD AUTO: 41 % (ref 42–52)
HDLC SERPL-MCNC: 63 MG/DL (ref 35–60)
HEMOLYSIS INTERF INDEX SERPL-ACNC: 4
HGB BLD-MCNC: 13.2 G/DL (ref 14–18)
ICTERIC INTERF INDEX SERPL-ACNC: 1
LDLC SERPL CALC-MCNC: 137 MG/DL (ref 50–140)
LIPEMIC INTERF INDEX SERPL-ACNC: 0
LYMPHOCYTES # BLD AUTO: 0.8 10*3/UL (ref 0.6–4.6)
LYMPHOCYTES NFR BLD AUTO: 19 %
MANUAL DIFF? (OHS): NO
MCH RBC QN AUTO: 31.1 PG (ref 27–31)
MCHC RBC AUTO-ENTMCNC: 32.2 G/DL (ref 33–36)
MCV RBC AUTO: 96.7 FL (ref 80–94)
MONOCYTES # BLD AUTO: 0.5 10*3/UL (ref 0.1–1.3)
MONOCYTES NFR BLD AUTO: 12 %
NEUTROPHILS # BLD AUTO: 2.91 10*3/UL (ref 2.1–9.2)
NEUTROPHILS NFR BLD AUTO: 67 %
PLATELET # BLD AUTO: 222 10*3/UL (ref 130–400)
PMV BLD AUTO: 11.3 FL (ref 9.4–12.4)
POTASSIUM SERPL-SCNC: 4.4 MMOL/L (ref 3.5–5.1)
PROT SERPL-MCNC: 6.3 G/DL (ref 5.8–7.6)
RBC # BLD AUTO: 4.24 10*6/UL (ref 4.7–6.1)
SODIUM SERPL-SCNC: 140 MMOL/L (ref 136–145)
TRIGL SERPL-MCNC: 62 MG/DL (ref 34–140)
TSH SERPL-ACNC: 1.82 M[IU]/L (ref 0.35–4.94)
VLDLC SERPL CALC-MCNC: 12 MG/DL
WBC # SPEC AUTO: 4.3 10*3/UL (ref 4.5–11.5)

## 2022-04-11 ENCOUNTER — HISTORICAL (OUTPATIENT)
Dept: ADMINISTRATIVE | Facility: HOSPITAL | Age: 72
End: 2022-04-11
Payer: MEDICARE

## 2022-04-12 ENCOUNTER — HISTORICAL (OUTPATIENT)
Dept: ADMINISTRATIVE | Facility: HOSPITAL | Age: 72
End: 2022-04-12

## 2022-04-12 LAB — VIT B12 SERPL-MCNC: 463 PG/ML (ref 213–816)

## 2022-04-20 ENCOUNTER — HISTORICAL (OUTPATIENT)
Dept: ADMINISTRATIVE | Facility: HOSPITAL | Age: 72
End: 2022-04-20
Payer: MEDICARE

## 2022-04-28 VITALS
DIASTOLIC BLOOD PRESSURE: 64 MMHG | SYSTOLIC BLOOD PRESSURE: 132 MMHG | BODY MASS INDEX: 26.51 KG/M2 | OXYGEN SATURATION: 97 % | WEIGHT: 179 LBS | HEIGHT: 69 IN

## 2022-06-02 LAB — CRC RECOMMENDATION EXT: NORMAL

## 2022-07-05 DIAGNOSIS — C61 MALIGNANT NEOPLASM OF PROSTATE: Primary | ICD-10-CM

## 2022-07-08 ENCOUNTER — LAB VISIT (OUTPATIENT)
Dept: LAB | Facility: HOSPITAL | Age: 72
End: 2022-07-08
Attending: INTERNAL MEDICINE
Payer: MEDICARE

## 2022-07-08 DIAGNOSIS — C61 MALIGNANT NEOPLASM OF PROSTATE: ICD-10-CM

## 2022-07-08 LAB
FREE/TOTAL PSA (OHS): <37 %
PSA FREE MFR SERPL: <37 %
PSA FREE SERPL-MCNC: <0.1 NG/ML
PSA SERPL-MCNC: 0.27 NG/ML

## 2022-07-08 PROCEDURE — 36415 COLL VENOUS BLD VENIPUNCTURE: CPT

## 2022-07-08 PROCEDURE — 84154 ASSAY OF PSA FREE: CPT

## 2022-07-08 PROCEDURE — 84153 ASSAY OF PSA TOTAL: CPT

## 2022-07-11 RX ORDER — TRIAMTERENE AND HYDROCHLOROTHIAZIDE 37.5; 25 MG/1; MG/1
1 CAPSULE ORAL DAILY
COMMUNITY
Start: 2022-04-12 | End: 2022-08-23

## 2022-07-11 RX ORDER — LEVOTHYROXINE SODIUM 75 UG/1
1 TABLET ORAL DAILY
COMMUNITY
Start: 2022-01-10 | End: 2023-01-11 | Stop reason: SDUPTHER

## 2022-07-11 RX ORDER — ALFUZOSIN HYDROCHLORIDE 10 MG/1
10 TABLET, EXTENDED RELEASE ORAL DAILY
COMMUNITY
Start: 2022-04-09

## 2022-07-11 RX ORDER — GLUCOSAMINE/CHONDR SU A SOD 750-600 MG
1500 TABLET ORAL 2 TIMES DAILY
COMMUNITY
Start: 2021-04-06 | End: 2022-10-12

## 2022-07-12 ENCOUNTER — OFFICE VISIT (OUTPATIENT)
Dept: HEMATOLOGY/ONCOLOGY | Facility: CLINIC | Age: 72
End: 2022-07-12
Payer: MEDICARE

## 2022-07-12 VITALS
OXYGEN SATURATION: 99 % | WEIGHT: 185.88 LBS | HEIGHT: 69 IN | TEMPERATURE: 97 F | RESPIRATION RATE: 18 BRPM | SYSTOLIC BLOOD PRESSURE: 130 MMHG | BODY MASS INDEX: 27.53 KG/M2 | HEART RATE: 51 BPM | DIASTOLIC BLOOD PRESSURE: 75 MMHG

## 2022-07-12 DIAGNOSIS — C61 PROSTATE CANCER: Primary | ICD-10-CM

## 2022-07-12 PROCEDURE — 99999 PR PBB SHADOW E&M-EST. PATIENT-LVL IV: ICD-10-PCS | Mod: PBBFAC,,, | Performed by: NURSE PRACTITIONER

## 2022-07-12 PROCEDURE — 99213 OFFICE O/P EST LOW 20 MIN: CPT | Mod: S$PBB,,, | Performed by: NURSE PRACTITIONER

## 2022-07-12 PROCEDURE — 99214 OFFICE O/P EST MOD 30 MIN: CPT | Mod: PBBFAC | Performed by: NURSE PRACTITIONER

## 2022-07-12 PROCEDURE — 99999 PR PBB SHADOW E&M-EST. PATIENT-LVL IV: CPT | Mod: PBBFAC,,, | Performed by: NURSE PRACTITIONER

## 2022-07-12 PROCEDURE — 99213 PR OFFICE/OUTPT VISIT, EST, LEVL III, 20-29 MIN: ICD-10-PCS | Mod: S$PBB,,, | Performed by: NURSE PRACTITIONER

## 2022-07-12 NOTE — PROGRESS NOTES
Subjective:       Patient ID: Matthew Mejia is a 71 y.o. male.    Chief Complaint:  Mild pelvic discomfort    Diagnosis: Locally advanced, high-grade prostate cancer      COVID-19 vaccinated    Current Treatment: Observation  Treatment History: XRT + Lupron completed 3/1/18; Zytiga/Prednisone 3/18-12/19, Lupron completed 9/19    Clinical History:  Patient has a history of BPH with a mildly elevated PSA level for many years. He had previous benign prostate biopsies in 2008. Routine lab work 6/14/17 showed further increase in his PSA level to 7.5 ng/mL with a serum testosterone of 547 ng/dL. He was treated with antibiotics but his PSA level remained elevated. Ultrasound was abnormal. MRI of the prostate 8/18/17 showed a volume of 38 grams with a 2.2 cm aggressive midline peripheral zone lesion at the base and mid gland. There was obliteration of the right rectal prostatic angle at the mid gland consistent with extracapsular disease and encasement of the intra-prostatic seminal vesicle origins. There was no neurovascular bundle involvement. There was no abnormal pelvic adenopathy.    On 9/20/17, he underwent cystoscopy with bilateral retrogrades, bladder biopsies and ultrasound-guided prostate biopsies. Bladder biopsies showed chronic cystitis with cystitis cystica and no evidence of atypia. Prostate biopsies were positive for adenocarcinoma, Ugo's 4+4 = 8 bilaterally with perineural extension on the right. He was evaluated by Dr. Sid Puckett at [facility] for a surgical opinion 10/12/17. Given his extraprostatic extension, primary surgery was not recommended. A bone scan was recommended to complete staging followed by androgen deprivation therapy and EBRT. He was referred to Dr. Martínez for a Radiation Oncology opinion. He underwent a CT PET scan 10/17/17 which showed no abnormal hypermetabolic activity to indicate metastatic disease. Whole-body bone scan 10/19/17 was negative for abnormal  osseous uptake. He was started on Casodex 50 mg daily followed by Lupron prior to starting radiation therapy.    He was seen as a new patient [facility] 10[facility]30/17 for treatment recommendations. Treatment with Zytiga and prednisone in combination with Lupron was recommended for up to 2 years as adjuvant therapy based on results from the STAMPEDE trial showing significant improvement in failure free survival versus androgen deprivation therapy alone. Follow-up PSA level 8/16/18 was <0.01 ng/mL. He developed radiation proctitis requiring treatment with oral steroids. Colonoscopy 9/26/18 showed minor radiation scarring and no evidence of polyps. His prednisone was gradually tapered back to a maintenance dose of 5 mg daily in combination with his Zytiga. PSA level has remained undetectable.    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 He was evaluated by Ortho and referred for physical therapy with improvement in her symptoms. His Lupron was discontinued 9/26/19.  He completed his intended course of Zytiga and Prednisone 12/19.         Interval History   He returns to the office today by himself for a 6-month follow-up visit.  He feels well overall.  He has minor fatigue, but remains active.  He reports mild ongoing perineal pain.  Previous   CT of the pelvis 4/08/22 showed no significant rectal or perineal pathology.  There was mild sigmoid diverticulosis without evidence of diverticulitis and avascular necrosis of the femoral heads. He underwent a colonoscopy 6/2/22 which revealed a 3 mm polyp in the descending colon with negative pathology.  He is not having any significant hip/bone pain.  Laboratory shows mild interval increase in the PSA level from 0.2 to 0.27.  CMP performed 4/22 was normal.      Review of patient's allergies indicates:  No Known Allergies   Review of Systems   Constitutional: Negative.     HENT: Negative.    Eyes: Negative.    Respiratory: Negative.    Cardiovascular: Negative.    Gastrointestinal: Negative.    Endocrine: Negative.    Genitourinary: Positive for urgency.        Mild perineal discomfort   Musculoskeletal: Negative.    Integumentary:  Negative.   Allergic/Immunologic: Negative.    Neurological: Negative.    Hematological: Negative.  Negative for adenopathy.   Psychiatric/Behavioral: Negative.          PMHx:  BPH, squamous cell carcinoma of the scalp, shingles    PSHx: Prostate biopsy, carpal tunnel release, tonsillectomy, eye surgery, skin cancer scalp    SH:  Lifetime nonsmoker, + social alcohol use. Retired from the technology industry. Lives in [place] with his wife.    FH:  His father had colon cancer, mother had breast cancer. His paternal grandfather had stomach cancer.     Objective:     Vitals:    07/12/22 0904   BP: 130/75   Pulse: (!) 51   Resp: 18   Temp: 97 °F (36.1 °C)         Physical Exam  Constitutional:       Appearance: Normal appearance.   HENT:      Head: Normocephalic.      Nose: Nose normal.   Eyes:      Conjunctiva/sclera: Conjunctivae normal.      Pupils: Pupils are equal, round, and reactive to light.   Cardiovascular:      Rate and Rhythm: Normal rate.   Pulmonary:      Effort: Pulmonary effort is normal.      Breath sounds: Normal breath sounds.   Chest:   Breasts:      Right: No axillary adenopathy or supraclavicular adenopathy.      Left: No axillary adenopathy or supraclavicular adenopathy.       Abdominal:      General: Bowel sounds are normal.      Palpations: Abdomen is soft.   Musculoskeletal:         General: Normal range of motion.      Cervical back: Normal range of motion.   Lymphadenopathy:      Cervical: No cervical adenopathy.      Upper Body:      Right upper body: No supraclavicular or axillary adenopathy.      Left upper body: No supraclavicular or axillary adenopathy.      Lower Body: No right inguinal adenopathy. No left inguinal  adenopathy.   Skin:     General: Skin is warm and dry.   Neurological:      Mental Status: He is alert and oriented to person, place, and time.   Psychiatric:         Mood and Affect: Mood normal.         Behavior: Behavior normal.       ECOG SCORE    1 - Restricted in strenuous activity-ambulatory and able to carry out work of a light nature                     11/12/19   3/12/20   6/16/20   11/4/20   3/29/21  7/28/21   1/24/22  7/8/22  PSA    <0.01        <0.1       <0.02       0.07        0.12       0.12        0.20     0.27       Recent Results (from the past 336 hour(s))   PSA, Total and Free    Collection Time: 07/08/22  8:16 AM   Result Value Ref Range    Prostate Specific Antigen 0.27 <=4.00 ng/mL    Prostate Specific Antigen Free <0.10 ng/mL    Free/Total PSA <37.0 %    PSA % Free <37 %        Assessment:   Locally advanced high-grade prostate cancer-clinical T3 with extraprostatic extension and no evidence of metastatic disease        Plan:   PSA shows mild interval increase without associated symptoms or other laboratory abnormalities.  Reassurance provided.  RTC 3-4 months for follow-up with PSA level.  All questions answered.    PARKER MORILLO, FNP-C      Other Physicians  Dr. Yonny Harkins

## 2022-08-22 ENCOUNTER — TELEPHONE (OUTPATIENT)
Dept: INTERNAL MEDICINE | Facility: CLINIC | Age: 72
End: 2022-08-22
Payer: MEDICARE

## 2022-08-22 NOTE — TELEPHONE ENCOUNTER
Carotid ultrasound can wait until scheduled appointment.  It is not needed done before visit tomorrow

## 2022-08-23 ENCOUNTER — OFFICE VISIT (OUTPATIENT)
Dept: INTERNAL MEDICINE | Facility: CLINIC | Age: 72
End: 2022-08-23
Payer: MEDICARE

## 2022-08-23 VITALS
SYSTOLIC BLOOD PRESSURE: 126 MMHG | RESPIRATION RATE: 18 BRPM | HEART RATE: 66 BPM | DIASTOLIC BLOOD PRESSURE: 72 MMHG | OXYGEN SATURATION: 95 % | HEIGHT: 69 IN | WEIGHT: 181.63 LBS | BODY MASS INDEX: 26.9 KG/M2

## 2022-08-23 DIAGNOSIS — H93.11 TINNITUS OF RIGHT EAR: Primary | ICD-10-CM

## 2022-08-23 DIAGNOSIS — J30.9 ALLERGIC RHINITIS, UNSPECIFIED SEASONALITY, UNSPECIFIED TRIGGER: ICD-10-CM

## 2022-08-23 DIAGNOSIS — I65.21 STENOSIS OF RIGHT CAROTID ARTERY: ICD-10-CM

## 2022-08-23 DIAGNOSIS — E03.9 HYPOTHYROIDISM, UNSPECIFIED TYPE: ICD-10-CM

## 2022-08-23 DIAGNOSIS — E78.5 HYPERLIPIDEMIA, UNSPECIFIED HYPERLIPIDEMIA TYPE: ICD-10-CM

## 2022-08-23 PROBLEM — R41.3 MEMORY IMPAIRMENT: Status: ACTIVE | Noted: 2022-08-23

## 2022-08-23 PROBLEM — C61 MALIGNANT NEOPLASM OF PROSTATE: Status: ACTIVE | Noted: 2022-08-23

## 2022-08-23 PROBLEM — Z80.0 FAMILY HISTORY OF COLON CANCER: Status: ACTIVE | Noted: 2022-04-27

## 2022-08-23 PROBLEM — M47.816 SPONDYLOSIS OF LUMBAR SPINE: Status: ACTIVE | Noted: 2022-08-23

## 2022-08-23 PROBLEM — Z00.00 WELLNESS EXAMINATION: Status: ACTIVE | Noted: 2022-08-23

## 2022-08-23 PROBLEM — M87.059 ASEPTIC NECROSIS OF FEMORAL HEAD: Status: ACTIVE | Noted: 2022-08-23

## 2022-08-23 PROCEDURE — 99213 PR OFFICE/OUTPT VISIT, EST, LEVL III, 20-29 MIN: ICD-10-PCS | Mod: ,,, | Performed by: INTERNAL MEDICINE

## 2022-08-23 PROCEDURE — 99213 OFFICE O/P EST LOW 20 MIN: CPT | Mod: ,,, | Performed by: INTERNAL MEDICINE

## 2022-08-23 RX ORDER — ASPIRIN 81 MG/1
81 TABLET ORAL DAILY
COMMUNITY

## 2022-08-23 NOTE — PROGRESS NOTES
Subjective:       Patient ID: Matthew Mejia is a 71 y.o. male.    Chief Complaint: Otalgia and Chest Pain (Ear and chest pain for the past couple of months off and on. Pt stated he has also been experiencing some memory lapses.)      Patient is a 71-year-old man coming in with complaints of a popping sensation in his ear that occurred a few months ago followed by tinnitus in the right ear that never resolved.  He also has what he feels like is a postnasal drip on the right side of his throat.  He is asking about referral to ENT.  I think that would be a good idea     He also has had some vague mild atypical right sided chest pain.  He points to the right side of the sternum.  Not exertional.  No shortness of breath.  He did see his urologist recently and his urologist noted something odd on exam.  He does know what that was.    Otalgia     Chest Pain       Review of Systems   HENT: Positive for ear pain.    Cardiovascular: Positive for chest pain.        Current Outpatient Medications on File Prior to Visit   Medication Sig Dispense Refill    alfuzosin (UROXATRAL) 10 mg Tb24 Take 10 mg by mouth once daily.      aspirin (ECOTRIN) 81 MG EC tablet Take 81 mg by mouth once daily.      calcium phosphate-vitamin D3 125 mg-3.125 mcg (125 unit) Chew Take by mouth once.      glucosamine HCl 1,500 mg Tab Take 1,500 mg by mouth 2 (two) times daily.      levothyroxine (SYNTHROID) 75 MCG tablet Take 1 tablet by mouth once daily.      [DISCONTINUED] doxycycline (VIBRAMYCIN) 100 MG Cap Take 100 mg by mouth.      [DISCONTINUED] dutasteride-tamsulosin 0.5-0.4 mg CM24       [DISCONTINUED] fish oil-omega-3 fatty acids 300-1,000 mg capsule Take 2 g by mouth once daily.      [DISCONTINUED] niacinamide 500 mg Tab Take 500 mg by mouth.      [DISCONTINUED] triamterene-hydrochlorothiazide 37.5-25 mg (DYAZIDE) 37.5-25 mg per capsule Take 1 capsule by mouth once daily.       No current facility-administered medications on file  "prior to visit.     Objective:      /72 (BP Location: Right arm, Patient Position: Sitting, BP Method: Large (Manual))   Pulse 66   Resp 18   Ht 5' 9" (1.753 m)   Wt 82.4 kg (181 lb 9.6 oz)   SpO2 95%   BMI 26.82 kg/m²     Physical Exam  Vitals reviewed.   Constitutional:       Appearance: Normal appearance.   HENT:      Head: Normocephalic and atraumatic.      Mouth/Throat:      Pharynx: Oropharynx is clear.   Eyes:      Pupils: Pupils are equal, round, and reactive to light.   Cardiovascular:      Rate and Rhythm: Normal rate and regular rhythm.      Pulses: Normal pulses.      Heart sounds: Normal heart sounds.      Comments: There are occasional premature beats followed by a compensatory pause.  I did not appreciate murmurs gallops or rubs.  He  Pulmonary:      Breath sounds: Normal breath sounds.   Abdominal:      General: Abdomen is flat.      Palpations: Abdomen is soft.   Musculoskeletal:      Cervical back: Neck supple.   Skin:     General: Skin is warm and dry.   Neurological:      Mental Status: He is alert.         Assessment:       1. Tinnitus of right ear    2. Allergic rhinitis, unspecified seasonality, unspecified trigger    3. Hyperlipidemia, unspecified hyperlipidemia type    4. Hypothyroidism, unspecified type    5. Stenosis of right carotid artery        Plan:       Refer to ENT to assess the tinnitus, right ear discomfort, and allergic rhinitis.    Continue same meds TLC etc..      Offer referral to Cardiology.  He will monitor it and let us know.  I think his chest symptoms are  nonspecific          "

## 2022-09-21 ENCOUNTER — HOSPITAL ENCOUNTER (OUTPATIENT)
Dept: CARDIOLOGY | Facility: HOSPITAL | Age: 72
Discharge: HOME | End: 2022-09-21
Attending: INTERNAL MEDICINE
Payer: MEDICARE

## 2022-09-21 DIAGNOSIS — I65.21 OCCLUSION OF RIGHT CAROTID ARTERY: ICD-10-CM

## 2022-09-21 LAB
LEFT CCA DIST DIAS: 17 CM/S
LEFT CCA DIST SYS: 82 CM/S
LEFT CCA PROX DIAS: 27 CM/S
LEFT CCA PROX SYS: 130 CM/S
LEFT ECA DIAS: 16 CM/S
LEFT ECA SYS: 118 CM/S
LEFT ICA DIST DIAS: 29 CM/S
LEFT ICA DIST SYS: 81 CM/S
LEFT ICA MID DIAS: 21 CM/S
LEFT ICA MID SYS: 66 CM/S
LEFT ICA PROX DIAS: 16 CM/S
LEFT ICA PROX SYS: 67 CM/S
LEFT VERTEBRAL DIAS: 18 CM/S
LEFT VERTEBRAL SYS: 52 CM/S
OHS CV CAROTID RIGHT ICA EDV HIGHEST: 32
OHS CV CAROTID ULTRASOUND LEFT ICA/CCA RATIO: 0.99
OHS CV CAROTID ULTRASOUND RIGHT ICA/CCA RATIO: 1.21
OHS CV PV CAROTID LEFT HIGHEST CCA: 130
OHS CV PV CAROTID LEFT HIGHEST ICA: 81
OHS CV PV CAROTID RIGHT HIGHEST CCA: 125
OHS CV PV CAROTID RIGHT HIGHEST ICA: 117
OHS CV US CAROTID LEFT HIGHEST EDV: 29
RIGHT CCA DIST DIAS: 19 CM/S
RIGHT CCA DIST SYS: 97 CM/S
RIGHT CCA PROX DIAS: 18 CM/S
RIGHT CCA PROX SYS: 125 CM/S
RIGHT ECA DIAS: 10 CM/S
RIGHT ECA SYS: 116 CM/S
RIGHT ICA DIST DIAS: 32 CM/S
RIGHT ICA DIST SYS: 117 CM/S
RIGHT ICA MID DIAS: 16 CM/S
RIGHT ICA MID SYS: 81 CM/S
RIGHT ICA PROX DIAS: 7 CM/S
RIGHT ICA PROX SYS: 98 CM/S
RIGHT VERTEBRAL DIAS: 7 CM/S
RIGHT VERTEBRAL SYS: 40 CM/S

## 2022-09-21 PROCEDURE — 93880 CV US DOPPLER CAROTID (CUPID ONLY): ICD-10-PCS | Mod: 26,,, | Performed by: SURGERY

## 2022-09-21 PROCEDURE — 93880 EXTRACRANIAL BILAT STUDY: CPT | Mod: 26,,, | Performed by: SURGERY

## 2022-09-21 PROCEDURE — 93880 EXTRACRANIAL BILAT STUDY: CPT

## 2022-10-05 DIAGNOSIS — E03.9 HYPOTHYROIDISM, UNSPECIFIED TYPE: ICD-10-CM

## 2022-10-05 DIAGNOSIS — I65.21 STENOSIS OF RIGHT CAROTID ARTERY: ICD-10-CM

## 2022-10-05 DIAGNOSIS — J30.9 ALLERGIC RHINITIS, UNSPECIFIED SEASONALITY, UNSPECIFIED TRIGGER: ICD-10-CM

## 2022-10-05 DIAGNOSIS — E78.5 HYPERLIPIDEMIA, UNSPECIFIED HYPERLIPIDEMIA TYPE: Primary | ICD-10-CM

## 2022-10-05 DIAGNOSIS — Z12.5 SCREENING PSA (PROSTATE SPECIFIC ANTIGEN): ICD-10-CM

## 2022-10-11 ENCOUNTER — LAB VISIT (OUTPATIENT)
Dept: LAB | Facility: HOSPITAL | Age: 72
End: 2022-10-11
Attending: NURSE PRACTITIONER
Payer: MEDICARE

## 2022-10-11 DIAGNOSIS — J30.9 ALLERGIC RHINITIS, UNSPECIFIED SEASONALITY, UNSPECIFIED TRIGGER: ICD-10-CM

## 2022-10-11 DIAGNOSIS — E78.5 HYPERLIPIDEMIA, UNSPECIFIED HYPERLIPIDEMIA TYPE: ICD-10-CM

## 2022-10-11 DIAGNOSIS — I65.21 STENOSIS OF RIGHT CAROTID ARTERY: ICD-10-CM

## 2022-10-11 DIAGNOSIS — E03.9 HYPOTHYROIDISM, UNSPECIFIED TYPE: ICD-10-CM

## 2022-10-11 DIAGNOSIS — C61 PROSTATE CANCER: ICD-10-CM

## 2022-10-11 LAB
ALBUMIN SERPL-MCNC: 4.4 GM/DL (ref 3.4–4.8)
ALBUMIN/GLOB SERPL: 1.6 RATIO (ref 1.1–2)
ALP SERPL-CCNC: 65 UNIT/L (ref 40–150)
ALT SERPL-CCNC: 26 UNIT/L (ref 0–55)
AST SERPL-CCNC: 25 UNIT/L (ref 5–34)
BASOPHILS # BLD AUTO: 0.01 X10(3)/MCL (ref 0–0.2)
BASOPHILS NFR BLD AUTO: 0.1 %
BILIRUBIN DIRECT+TOT PNL SERPL-MCNC: 0.6 MG/DL
BUN SERPL-MCNC: 17.6 MG/DL (ref 8.4–25.7)
CALCIUM SERPL-MCNC: 9.7 MG/DL (ref 8.8–10)
CHLORIDE SERPL-SCNC: 108 MMOL/L (ref 98–107)
CHOLEST SERPL-MCNC: 218 MG/DL
CHOLEST/HDLC SERPL: 3 {RATIO} (ref 0–5)
CO2 SERPL-SCNC: 24 MMOL/L (ref 23–31)
CREAT SERPL-MCNC: 0.83 MG/DL (ref 0.73–1.18)
EOSINOPHIL # BLD AUTO: 0 X10(3)/MCL (ref 0–0.9)
EOSINOPHIL NFR BLD AUTO: 0 %
ERYTHROCYTE [DISTWIDTH] IN BLOOD BY AUTOMATED COUNT: 13.7 % (ref 11.5–17)
FREE/TOTAL PSA (OHS): <35.7 %
GFR SERPLBLD CREATININE-BSD FMLA CKD-EPI: >60 MLS/MIN/1.73/M2
GLOBULIN SER-MCNC: 2.7 GM/DL (ref 2.4–3.5)
GLUCOSE SERPL-MCNC: 111 MG/DL (ref 82–115)
HCT VFR BLD AUTO: 42 % (ref 42–52)
HDLC SERPL-MCNC: 78 MG/DL (ref 35–60)
HGB BLD-MCNC: 14.2 GM/DL (ref 14–18)
IMM GRANULOCYTES # BLD AUTO: 0.05 X10(3)/MCL (ref 0–0.04)
IMM GRANULOCYTES NFR BLD AUTO: 0.5 %
LDLC SERPL CALC-MCNC: 133 MG/DL (ref 50–140)
LYMPHOCYTES # BLD AUTO: 0.59 X10(3)/MCL (ref 0.6–4.6)
LYMPHOCYTES NFR BLD AUTO: 6.2 %
MCH RBC QN AUTO: 31.6 PG (ref 27–31)
MCHC RBC AUTO-ENTMCNC: 33.8 MG/DL (ref 33–36)
MCV RBC AUTO: 93.3 FL (ref 80–94)
MONOCYTES # BLD AUTO: 0.93 X10(3)/MCL (ref 0.1–1.3)
MONOCYTES NFR BLD AUTO: 9.7 %
NEUTROPHILS # BLD AUTO: 8 X10(3)/MCL (ref 2.1–9.2)
NEUTROPHILS NFR BLD AUTO: 83.5 %
NRBC BLD AUTO-RTO: 0 %
PLATELET # BLD AUTO: 220 X10(3)/MCL (ref 130–400)
PMV BLD AUTO: 11.6 FL (ref 7.4–10.4)
POTASSIUM SERPL-SCNC: 4.4 MMOL/L (ref 3.5–5.1)
PROT SERPL-MCNC: 7.1 GM/DL (ref 5.8–7.6)
PSA FREE MFR SERPL: <36 %
PSA FREE SERPL-MCNC: <0.1 NG/ML
PSA SERPL-MCNC: 0.28 NG/ML
RBC # BLD AUTO: 4.5 X10(6)/MCL (ref 4.7–6.1)
SODIUM SERPL-SCNC: 141 MMOL/L (ref 136–145)
TRIGL SERPL-MCNC: 36 MG/DL (ref 34–140)
TSH SERPL-ACNC: 0.27 UIU/ML (ref 0.35–4.94)
VLDLC SERPL CALC-MCNC: 7 MG/DL
WBC # SPEC AUTO: 9.5 X10(3)/MCL (ref 4.5–11.5)

## 2022-10-11 PROCEDURE — 84443 ASSAY THYROID STIM HORMONE: CPT

## 2022-10-11 PROCEDURE — 80061 LIPID PANEL: CPT

## 2022-10-11 PROCEDURE — 80053 COMPREHEN METABOLIC PANEL: CPT

## 2022-10-11 PROCEDURE — 84154 ASSAY OF PSA FREE: CPT

## 2022-10-11 PROCEDURE — 85025 COMPLETE CBC W/AUTO DIFF WBC: CPT

## 2022-10-11 PROCEDURE — 84153 ASSAY OF PSA TOTAL: CPT

## 2022-10-11 PROCEDURE — 36415 COLL VENOUS BLD VENIPUNCTURE: CPT

## 2022-10-12 ENCOUNTER — OFFICE VISIT (OUTPATIENT)
Dept: INTERNAL MEDICINE | Facility: CLINIC | Age: 72
End: 2022-10-12
Payer: MEDICARE

## 2022-10-12 VITALS
HEART RATE: 62 BPM | DIASTOLIC BLOOD PRESSURE: 68 MMHG | HEIGHT: 69 IN | SYSTOLIC BLOOD PRESSURE: 124 MMHG | WEIGHT: 184.38 LBS | OXYGEN SATURATION: 96 % | RESPIRATION RATE: 18 BRPM | BODY MASS INDEX: 27.31 KG/M2

## 2022-10-12 DIAGNOSIS — R73.9 HYPERGLYCEMIA: ICD-10-CM

## 2022-10-12 DIAGNOSIS — E03.9 HYPOTHYROIDISM, UNSPECIFIED TYPE: ICD-10-CM

## 2022-10-12 DIAGNOSIS — C61 PROSTATE CANCER: ICD-10-CM

## 2022-10-12 DIAGNOSIS — H66.90 OTITIS MEDIA, UNSPECIFIED LATERALITY, UNSPECIFIED OTITIS MEDIA TYPE: ICD-10-CM

## 2022-10-12 DIAGNOSIS — E78.5 HYPERLIPIDEMIA, UNSPECIFIED HYPERLIPIDEMIA TYPE: Primary | ICD-10-CM

## 2022-10-12 DIAGNOSIS — I65.21 STENOSIS OF RIGHT CAROTID ARTERY: ICD-10-CM

## 2022-10-12 PROCEDURE — 99214 OFFICE O/P EST MOD 30 MIN: CPT | Mod: ,,, | Performed by: INTERNAL MEDICINE

## 2022-10-12 PROCEDURE — 99214 PR OFFICE/OUTPT VISIT, EST, LEVL IV, 30-39 MIN: ICD-10-PCS | Mod: ,,, | Performed by: INTERNAL MEDICINE

## 2022-10-12 RX ORDER — ALBUTEROL SULFATE 90 UG/1
AEROSOL, METERED RESPIRATORY (INHALATION)
COMMUNITY
Start: 2022-10-10 | End: 2023-01-17

## 2022-10-12 NOTE — PROGRESS NOTES
"Subjective:       Patient ID: Matthew Mejia is a 72 y.o. male.    Chief Complaint: Follow-up      The patient is a 72-year-old man in for follow-up of multiple medical problems.  He has had a recent ear infection and was treated by a nurse practitioner 2 days ago.  He received a steroid shot and a Zithromax try pack.  He also was prescribed day Phenergan with dextromethorphan am which was on back order.  I did recommend he take Robitussin DM instead.  He is better.  Did have a low-grade fever but that has resolved.  Still has a stopped up right ear.  He was seeing Dr. Leal for popping in tinnitus of the ear.  He continues to see him.    Follow-up    Review of Systems     Current Outpatient Medications on File Prior to Visit   Medication Sig Dispense Refill    albuterol (PROVENTIL/VENTOLIN HFA) 90 mcg/actuation inhaler       alfuzosin (UROXATRAL) 10 mg Tb24 Take 10 mg by mouth once daily.      aspirin (ECOTRIN) 81 MG EC tablet Take 81 mg by mouth once daily.      calcium phosphate-vitamin D3 125 mg-3.125 mcg (125 unit) Chew Take by mouth once.      levothyroxine (SYNTHROID) 75 MCG tablet Take 1 tablet by mouth once daily.      [DISCONTINUED] glucosamine HCl 1,500 mg Tab Take 1,500 mg by mouth 2 (two) times daily.       No current facility-administered medications on file prior to visit.     Objective:      /68 (BP Location: Right arm, Patient Position: Sitting, BP Method: Large (Manual))   Pulse 62   Resp 18   Ht 5' 9" (1.753 m)   Wt 83.6 kg (184 lb 6.4 oz)   SpO2 96%   BMI 27.23 kg/m²     Physical Exam  Vitals reviewed.   Constitutional:       Appearance: Normal appearance.   HENT:      Head: Normocephalic and atraumatic.      Mouth/Throat:      Pharynx: Oropharynx is clear.   Eyes:      Pupils: Pupils are equal, round, and reactive to light.   Cardiovascular:      Rate and Rhythm: Normal rate and regular rhythm.      Pulses: Normal pulses.      Heart sounds: Normal heart sounds.      Comments: " There is a 2/6 systolic murmur left sternal border  Pulmonary:      Breath sounds: Normal breath sounds.   Abdominal:      General: Abdomen is flat.      Palpations: Abdomen is soft.   Musculoskeletal:      Cervical back: Neck supple.   Skin:     General: Skin is warm and dry.   Neurological:      Mental Status: He is alert.       Assessment:       1. Hyperlipidemia, unspecified hyperlipidemia type    2. Hypothyroidism, unspecified type    3. Stenosis of right carotid artery    4. Prostate cancer    5. Otitis media, unspecified laterality, unspecified otitis media type    6. Hyperglycemia        1. Hyperlipidemia.  Improved     2. Hypothyroidism.  Clinically euthyroid but TSH slightly suppressed.  On replacement hormone    3. History of carotid stenosis.  Ultrasound from 6 months ago showed no significant blockage    4. Prostate CA.  Treated by his oncologist    5. Otitis media.  Better    6. Hyperglycemia.  Likely a result of recent steroid shot  Plan:       Continue same meds and follow-up with me 6 months for annual checkup with CBC CMP lipid TSH prior.  PSAs done by his urologist and/or oncologist.

## 2022-10-13 NOTE — PROGRESS NOTES
Subjective:       Patient ID: Matthew Mejia is a 72 y.o. male.    Chief Complaint:  I am getting over a cold    Diagnosis: Locally advanced, high-grade prostate cancer        + COVID-19 vaccinated    Treatment History  XRT + Lupron completed 3/1/18  Zytiga/Prednisone 3/18-12/19 --> Lupron completed 9/19    Current Treatment: Observation    Clinical History:  Patient has a history of BPH with a mildly elevated PSA level for many years. He had previous benign prostate biopsies in 2008. Routine lab work 6/14/17 showed further increase in his PSA level to 7.5 ng/mL with a serum testosterone of 547 ng/dL. He was treated with antibiotics but his PSA level remained elevated. Ultrasound was abnormal. MRI of the prostate 8/18/17 showed a volume of 38 grams with a 2.2 cm aggressive midline peripheral zone lesion at the base and mid gland. There was obliteration of the right rectal prostatic angle at the mid gland consistent with extracapsular disease and encasement of the intra-prostatic seminal vesicle origins. There was no neurovascular bundle involvement. There was no abnormal pelvic adenopathy.    On 9/20/17, he underwent cystoscopy with bilateral retrogrades, bladder biopsies and ultrasound-guided prostate biopsies. Bladder biopsies showed chronic cystitis with cystitis cystica and no evidence of atypia. Prostate biopsies were positive for adenocarcinoma, [place] 4+4 = 8 bilaterally with perineural extension on the right. He was evaluated by Dr. Sid Puckett at [facility] for a surgical opinion 10/12/17. Given his extraprostatic extension, primary surgery was not recommended. A bone scan was recommended to complete staging followed by androgen deprivation therapy and EBRT. He was referred to Dr. Martínez for a Radiation Oncology opinion. He underwent a CT PET scan 10/17/17 which showed no abnormal hypermetabolic activity to indicate metastatic disease. Whole-body bone scan 10/19/17 was negative for  abnormal osseous uptake. He was started on Casodex 50 mg daily followed by Lupron prior to starting radiation therapy.    He was seen as a new patient at [facility] 10/30/17 for treatment recommendations. Treatment with Zytiga and prednisone in combination with Lupron was recommended for up to 2 years as adjuvant therapy based on results from the STAMPEDE trial showing significant improvement in failure free survival versus androgen deprivation therapy alone. Follow-up PSA level 8/16/18 was <0.01 ng/mL. He developed radiation proctitis requiring treatment with oral steroids. Colonoscopy 9/26/18 showed minor radiation scarring and no evidence of polyps. His prednisone was gradually tapered back to a maintenance dose of 5 mg daily in combination with his Zytiga. PSA level has remained undetectable.    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 He was evaluated by Ortho and referred for physical therapy with improvement in her symptoms. His Lupron was discontinued 9/26/19.  He completed his intended course of Zytiga and Prednisone 12/19.         Interval History   He returns to clinic today by himself for a three-month follow-up visit.  He continues to feel well.  He has occasional mild paresthesias involving the perineum which are occurring less frequently.  CT of the pelvis 4/8/22 showed no significant rectal or perineal pathology.  There was mild sigmoid diverticulosis and avascular necrosis of the femoral heads.  Colonoscopy 6/2/22 showed a 3 mm polyp in the descending colon with negative pathology.  He reports no problems with his bowel movements, no melena or hematochezia.  No weight loss.  He has mild urinary urgency which is stable, no difficulty voiding.  PSA level is stable at 0.28 ng/mL.       Review of Systems   Constitutional:  Negative for appetite change, fatigue, fever and unexpected weight  change.   HENT:  Negative for mouth sores, sore throat and trouble swallowing.    Eyes: Negative.    Respiratory:  Negative for cough, chest tightness and shortness of breath.    Cardiovascular:  Negative for chest pain, palpitations and leg swelling.   Gastrointestinal:  Negative for abdominal distention, abdominal pain, blood in stool, change in bowel habit, constipation, diarrhea, nausea, vomiting and change in bowel habit.   Genitourinary:  Positive for urgency. Negative for difficulty urinating, dysuria and frequency.        Mild perineal discomfort   Musculoskeletal:  Negative for arthralgias and back pain.   Integumentary:  Negative for rash and mole/lesion.   Neurological:  Negative for dizziness, weakness, numbness and headaches.   Hematological:  Negative for adenopathy. Does not bruise/bleed easily.   Psychiatric/Behavioral: Negative.         PMHx:  BPH, squamous cell carcinoma of the scalp, shingles  PSHx: Prostate biopsy, carpal tunnel release, tonsillectomy, eye surgery, skin cancer scalp  SH:  Lifetime nonsmoker, + social alcohol use. Retired from the technology industry. Lives in [place] with his wife.  FH:  His father had colon cancer, mother had breast cancer. His paternal grandfather had stomach cancer.       Objective:     Vitals:    10/18/22 0847   BP: 136/75   Pulse: (!) 53   Resp: 18   Temp: 98.5 °F (36.9 °C)           Physical Exam  Constitutional:       Comments: Elderly, well-developed white male in NAD.   HENT:      Head: Normocephalic.      Nose: Nose normal.      Mouth/Throat:      Mouth: Mucous membranes are moist.      Pharynx: Oropharynx is clear. No posterior oropharyngeal erythema.   Eyes:      Conjunctiva/sclera: Conjunctivae normal.      Pupils: Pupils are equal, round, and reactive to light.   Cardiovascular:      Rate and Rhythm: Normal rate and regular rhythm.      Heart sounds: No murmur heard.  Pulmonary:      Comments: Lungs clear to auscultation throughout.  Abdominal:       General: Bowel sounds are normal. There is no distension.      Palpations: Abdomen is soft. There is no mass.      Tenderness: There is no abdominal tenderness.   Musculoskeletal:         General: No swelling or tenderness. Normal range of motion.      Cervical back: Normal range of motion.   Lymphadenopathy:      Cervical: No cervical adenopathy.      Upper Body:      Right upper body: No supraclavicular or axillary adenopathy.      Left upper body: No supraclavicular or axillary adenopathy.      Lower Body: No right inguinal adenopathy. No left inguinal adenopathy.   Skin:     General: Skin is warm and dry.   Neurological:      Mental Status: He is alert and oriented to person, place, and time.   Psychiatric:         Mood and Affect: Mood normal.         Behavior: Behavior normal.     ECOG SCORE    0 - Fully active-able to carry on all pre-disease performance without restriction                 11/12/19   3/12/20   6/16/20   11/4/20   3/29/21  7/28/21   1/24/22  7/8/22   10/11/22  PSA    <0.01        <0.1       <0.02       0.07        0.12       0.12        0.20      0.27         0.28       Recent Results (from the past 336 hour(s))   PSA, Total and Free    Collection Time: 10/11/22  7:02 AM   Result Value Ref Range    Prostate Specific Antigen 0.28 <=4.00 ng/mL    Prostate Specific Antigen Free <0.10 ng/mL    Free/Total PSA <35.7 %    PSA % Free <36 %   Comprehensive Metabolic Panel    Collection Time: 10/11/22  7:02 AM   Result Value Ref Range    Sodium Level 141 136 - 145 mmol/L    Potassium Level 4.4 3.5 - 5.1 mmol/L    Chloride 108 (H) 98 - 107 mmol/L    Carbon Dioxide 24 23 - 31 mmol/L    Glucose Level 111 82 - 115 mg/dL    Blood Urea Nitrogen 17.6 8.4 - 25.7 mg/dL    Creatinine 0.83 0.73 - 1.18 mg/dL    Calcium Level Total 9.7 8.8 - 10.0 mg/dL    Protein Total 7.1 5.8 - 7.6 gm/dL    Albumin Level 4.4 3.4 - 4.8 gm/dL    Globulin 2.7 2.4 - 3.5 gm/dL    Albumin/Globulin Ratio 1.6 1.1 - 2.0 ratio    Bilirubin  Total 0.6 <=1.5 mg/dL    Alkaline Phosphatase 65 40 - 150 unit/L    Alanine Aminotransferase 26 0 - 55 unit/L    Aspartate Aminotransferase 25 5 - 34 unit/L    eGFR >60 mls/min/1.73/m2   Lipid Panel    Collection Time: 10/11/22  7:02 AM   Result Value Ref Range    Cholesterol Total 218 (H) <=200 mg/dL    HDL Cholesterol 78 (H) 35 - 60 mg/dL    Triglyceride 36 34 - 140 mg/dL    Cholesterol/HDL Ratio 3 0 - 5    Very Low Density Lipoprotein 7     LDL Cholesterol 133.00 50.00 - 140.00 mg/dL   TSH    Collection Time: 10/11/22  7:02 AM   Result Value Ref Range    Thyroid Stimulating Hormone 0.2661 (L) 0.3500 - 4.9400 uIU/mL   CBC with Differential    Collection Time: 10/11/22  7:02 AM   Result Value Ref Range    WBC 9.5 4.5 - 11.5 x10(3)/mcL    RBC 4.50 (L) 4.70 - 6.10 x10(6)/mcL    Hgb 14.2 14.0 - 18.0 gm/dL    Hct 42.0 42.0 - 52.0 %    MCV 93.3 80.0 - 94.0 fL    MCH 31.6 (H) 27.0 - 31.0 pg    MCHC 33.8 33.0 - 36.0 mg/dL    RDW 13.7 11.5 - 17.0 %    Platelet 220 130 - 400 x10(3)/mcL    MPV 11.6 (H) 7.4 - 10.4 fL    Neut % 83.5 %    Lymph % 6.2 %    Mono % 9.7 %    Eos % 0.0 %    Basophil % 0.1 %    Lymph # 0.59 (L) 0.6 - 4.6 x10(3)/mcL    Neut # 8.0 2.1 - 9.2 x10(3)/mcL    Mono # 0.93 0.1 - 1.3 x10(3)/mcL    Eos # 0.00 0 - 0.9 x10(3)/mcL    Baso # 0.01 0 - 0.2 x10(3)/mcL    IG# 0.05 (H) 0 - 0.04 x10(3)/mcL    IG% 0.5 %    NRBC% 0.0 %        Assessment:   Locally advanced high-grade prostate cancer - clinical T3 with extraprostatic extension and no evidence of metastatic disease      Plan:   Patient remains asymptomatic with a stable PSA level.  Continued close observation is recommended.  If his PSA level increases to >1.0, will consider a PSMA scan.  RTC in 3 months for a follow-up visit with a CMP and PSA level.      EDEN SANTIAGO MD      Other Physicians  Dr. Eden Harkins

## 2022-10-18 ENCOUNTER — OFFICE VISIT (OUTPATIENT)
Dept: HEMATOLOGY/ONCOLOGY | Facility: CLINIC | Age: 72
End: 2022-10-18
Payer: MEDICARE

## 2022-10-18 VITALS
TEMPERATURE: 99 F | WEIGHT: 184.38 LBS | RESPIRATION RATE: 18 BRPM | BODY MASS INDEX: 27.31 KG/M2 | OXYGEN SATURATION: 99 % | HEIGHT: 69 IN | HEART RATE: 53 BPM | SYSTOLIC BLOOD PRESSURE: 136 MMHG | DIASTOLIC BLOOD PRESSURE: 75 MMHG

## 2022-10-18 DIAGNOSIS — C61 MALIGNANT NEOPLASM OF PROSTATE: Primary | ICD-10-CM

## 2022-10-18 PROCEDURE — 99214 OFFICE O/P EST MOD 30 MIN: CPT | Mod: S$PBB,,, | Performed by: INTERNAL MEDICINE

## 2022-10-18 PROCEDURE — 99214 PR OFFICE/OUTPT VISIT, EST, LEVL IV, 30-39 MIN: ICD-10-PCS | Mod: S$PBB,,, | Performed by: INTERNAL MEDICINE

## 2022-10-18 PROCEDURE — 99999 PR PBB SHADOW E&M-EST. PATIENT-LVL IV: CPT | Mod: PBBFAC,,, | Performed by: INTERNAL MEDICINE

## 2022-10-18 PROCEDURE — 99214 OFFICE O/P EST MOD 30 MIN: CPT | Mod: PBBFAC | Performed by: INTERNAL MEDICINE

## 2022-10-18 PROCEDURE — 99999 PR PBB SHADOW E&M-EST. PATIENT-LVL IV: ICD-10-PCS | Mod: PBBFAC,,, | Performed by: INTERNAL MEDICINE

## 2022-10-18 RX ORDER — BIOFLAV,LEMON/VIT BCOMP,C 200-100 MG
TABLET ORAL
COMMUNITY
End: 2023-01-17

## 2022-10-18 RX ORDER — PROMETHAZINE HYDROCHLORIDE AND DEXTROMETHORPHAN HYDROBROMIDE 6.25; 15 MG/5ML; MG/5ML
5 SYRUP ORAL
COMMUNITY
Start: 2022-10-10 | End: 2023-01-17

## 2022-11-28 PROBLEM — Z00.00 WELLNESS EXAMINATION: Status: RESOLVED | Noted: 2022-08-23 | Resolved: 2022-11-28

## 2023-01-10 ENCOUNTER — LAB VISIT (OUTPATIENT)
Dept: LAB | Facility: HOSPITAL | Age: 73
End: 2023-01-10
Attending: INTERNAL MEDICINE
Payer: MEDICARE

## 2023-01-10 DIAGNOSIS — C61 MALIGNANT NEOPLASM OF PROSTATE: ICD-10-CM

## 2023-01-10 LAB
BASOPHILS # BLD AUTO: 0.03 X10(3)/MCL (ref 0–0.2)
BASOPHILS NFR BLD AUTO: 0.8 %
EOSINOPHIL # BLD AUTO: 0.06 X10(3)/MCL (ref 0–0.9)
EOSINOPHIL NFR BLD AUTO: 1.6 %
ERYTHROCYTE [DISTWIDTH] IN BLOOD BY AUTOMATED COUNT: 13 % (ref 11.5–17)
HCT VFR BLD AUTO: 40.1 % (ref 42–52)
HGB BLD-MCNC: 13 GM/DL (ref 14–18)
IMM GRANULOCYTES # BLD AUTO: 0.01 X10(3)/MCL (ref 0–0.04)
IMM GRANULOCYTES NFR BLD AUTO: 0.3 %
LYMPHOCYTES # BLD AUTO: 1.05 X10(3)/MCL (ref 0.6–4.6)
LYMPHOCYTES NFR BLD AUTO: 27.7 %
MCH RBC QN AUTO: 31 PG
MCHC RBC AUTO-ENTMCNC: 32.4 MG/DL (ref 33–36)
MCV RBC AUTO: 95.7 FL (ref 80–94)
MONOCYTES # BLD AUTO: 0.63 X10(3)/MCL (ref 0.1–1.3)
MONOCYTES NFR BLD AUTO: 16.6 %
NEUTROPHILS # BLD AUTO: 2.01 X10(3)/MCL (ref 2.1–9.2)
NEUTROPHILS NFR BLD AUTO: 53 %
PLATELET # BLD AUTO: 220 X10(3)/MCL (ref 130–400)
PMV BLD AUTO: 11.4 FL (ref 7.4–10.4)
PSA SERPL-MCNC: 0.35 NG/ML
RBC # BLD AUTO: 4.19 X10(6)/MCL (ref 4.7–6.1)
WBC # SPEC AUTO: 3.8 X10(3)/MCL (ref 4.5–11.5)

## 2023-01-10 PROCEDURE — 85025 COMPLETE CBC W/AUTO DIFF WBC: CPT

## 2023-01-10 PROCEDURE — 36415 COLL VENOUS BLD VENIPUNCTURE: CPT

## 2023-01-10 PROCEDURE — 84153 ASSAY OF PSA TOTAL: CPT

## 2023-01-11 DIAGNOSIS — E03.9 HYPOTHYROIDISM, UNSPECIFIED TYPE: Primary | ICD-10-CM

## 2023-01-11 RX ORDER — LEVOTHYROXINE SODIUM 75 UG/1
75 TABLET ORAL DAILY
Qty: 90 TABLET | Refills: 3 | Status: SHIPPED | OUTPATIENT
Start: 2023-01-11 | End: 2024-01-03 | Stop reason: SDUPTHER

## 2023-01-13 NOTE — PROGRESS NOTES
Subjective:       Patient ID: Matthew Mejia is a 72 y.o. male.    Chief Complaint:  Intermittent pelvic and groin pain    Diagnosis: Locally advanced, high-grade prostate cancer        + COVID-19 vaccinated    Treatment History  XRT + Lupron completed 3/1/18  Zytiga/Prednisone 3/18-12/19 --> Lupron completed 9/19    Current Treatment: Observation    Clinical History:  Patient has a history of BPH with a mildly elevated PSA level for many years. He had previous benign prostate biopsies in 2008. Routine lab work 6/14/17 showed further increase in his PSA level to 7.5 ng/mL with a serum testosterone of 547 ng/dL. He was treated with antibiotics but his PSA level remained elevated. Ultrasound was abnormal. MRI of the prostate 8/18/17 showed a volume of 38 grams with a 2.2 cm aggressive midline peripheral zone lesion at the base and mid gland. There was obliteration of the right rectal prostatic angle at the mid gland consistent with extracapsular disease and encasement of the intra-prostatic seminal vesicle origins. There was no neurovascular bundle involvement. There was no abnormal pelvic adenopathy.    On 9/20/17, he underwent cystoscopy with bilateral retrogrades, bladder biopsies and ultrasound-guided prostate biopsies. Bladder biopsies showed chronic cystitis with cystitis cystica and no evidence of atypia. Prostate biopsies were positive for adenocarcinoma, [place]'s 4+4 = 8 bilaterally with perineural extension on the right. He was evaluated by Dr. Sid Puckett at [facility] for a surgical opinion 10/12/17. Given his extraprostatic extension, primary surgery was not recommended. A bone scan was recommended to complete staging followed by androgen deprivation therapy and EBRT. He was referred to Dr. Martínez for a Radiation Oncology opinion. He underwent a CT PET scan 10/17/17 which showed no abnormal hypermetabolic activity to indicate metastatic disease. Whole-body bone scan 10/19/17 was  negative for abnormal osseous uptake. He was started on Casodex 50 mg daily followed by Lupron prior to starting radiation therapy.    He was seen as a new patient at [facility] 10/30/17 for treatment recommendations. Treatment with Zytiga and prednisone in combination with Lupron was recommended for up to 2 years as adjuvant therapy based on results from the STAMPEDE trial showing significant improvement in failure free survival versus androgen deprivation therapy alone. Follow-up PSA level 8/16/18 was <0.01 ng/mL. He developed radiation proctitis requiring treatment with oral steroids. Colonoscopy 9/26/18 showed minor radiation scarring and no evidence of polyps. His prednisone was gradually tapered back to a maintenance dose of 5 mg daily in combination with his Zytiga. PSA level has remained undetectable.    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 He was evaluated by Ortho and referred for physical therapy with improvement in her symptoms. His Lupron was discontinued 9/26/19.  He completed his intended course of Zytiga and Prednisone 12/19.     Interval History   Mr. Mejia is here today by himself for a three month follow-up visit.  He is ambulatory without assistance.  He denies any recent illnesses or infections.  He reports intermittent neuropathic pain involving the perineum and scrotum.  He has reported this during previous visits.  He is more aware of the symptoms at night.  His symptoms are not associated with changes in his bowel or bladder activity.  Laboratory for this visit shows minor increase in his PSA level from 0.28 to 0.35.  He denies any bone pain.  He also reports memory loss, particularly long term memory/recall.  He plans to schedule an appointment with his primary care doctor for further evaluation.       Review of Systems   Constitutional:  Negative for appetite change, fatigue,  fever and unexpected weight change.   HENT:  Negative for mouth sores, sore throat and trouble swallowing.    Eyes: Negative.  Negative for visual disturbance.   Respiratory:  Negative for cough, chest tightness and shortness of breath.    Cardiovascular:  Negative for chest pain, palpitations and leg swelling.   Gastrointestinal:  Negative for abdominal distention, abdominal pain, blood in stool, change in bowel habit, constipation, diarrhea, nausea, vomiting and change in bowel habit.   Genitourinary:  Positive for urgency. Negative for difficulty urinating, dysuria and frequency.        Mild perineal discomfort, neuropathic in nature   Musculoskeletal:  Negative for arthralgias and back pain.   Integumentary:  Negative for rash and mole/lesion.   Neurological:  Negative for dizziness, weakness, numbness and headaches.   Hematological:  Negative for adenopathy. Does not bruise/bleed easily.   Psychiatric/Behavioral:  Positive for confusion (memory loss). The patient is not nervous/anxious.        PMHx:  BPH, squamous cell carcinoma of the scalp, shingles  PSHx: Prostate biopsy, carpal tunnel release, tonsillectomy, eye surgery, skin cancer scalp  SH:  Lifetime nonsmoker, + social alcohol use. 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  FH:  His father had colon cancer, mother had breast cancer. His paternal grandfather had stomach cancer.       Objective:     Vitals:    01/17/23 0855   BP: 126/73   Pulse: 64   Resp: 18   Temp: 98.3 °F (36.8 °C)             Physical Exam  Constitutional:       Comments: Elderly, well-developed white male in NAD.   HENT:      Head: Normocephalic.      Nose: Nose normal.      Mouth/Throat:      Mouth: Mucous membranes are moist.      Pharynx: Oropharynx is clear. No posterior oropharyngeal erythema.   Eyes:      Conjunctiva/sclera: Conjunctivae normal.      Pupils: Pupils are equal, round, and reactive to light.   Cardiovascular:      Rate and Rhythm:  Normal rate and regular rhythm.      Heart sounds: No murmur heard.  Pulmonary:      Comments: Lungs clear to auscultation throughout.  Abdominal:      General: Bowel sounds are normal. There is no distension.      Palpations: Abdomen is soft. There is no mass.      Tenderness: There is no abdominal tenderness.   Musculoskeletal:         General: No swelling or tenderness. Normal range of motion.      Cervical back: Normal range of motion.   Lymphadenopathy:      Cervical: No cervical adenopathy.      Upper Body:      Right upper body: No supraclavicular or axillary adenopathy.      Left upper body: No supraclavicular or axillary adenopathy.      Lower Body: No right inguinal adenopathy. No left inguinal adenopathy.   Skin:     General: Skin is warm and dry.   Neurological:      Mental Status: He is alert and oriented to person, place, and time.   Psychiatric:         Mood and Affect: Mood normal.         Behavior: Behavior normal.     ECOG SCORE                     6/16/20   11/4/20   3/29/21  7/28/21   1/24/22  7/8/22   10/11/22   1/10/23  PSA   <0.02       0.07        0.12       0.12        0.20      0.27         0.28        0.35       Recent Results (from the past 336 hour(s))   PSA, Total (Diagnostic)    Collection Time: 01/10/23  8:05 AM   Result Value Ref Range    Prostate Specific Antigen 0.35 <=4.00 ng/mL   CBC with Differential    Collection Time: 01/10/23  8:05 AM   Result Value Ref Range    WBC 3.8 (L) 4.5 - 11.5 x10(3)/mcL    RBC 4.19 (L) 4.70 - 6.10 x10(6)/mcL    Hgb 13.0 (L) 14.0 - 18.0 gm/dL    Hct 40.1 (L) 42.0 - 52.0 %    MCV 95.7 (H) 80.0 - 94.0 fL    MCH 31.0 pg    MCHC 32.4 (L) 33.0 - 36.0 mg/dL    RDW 13.0 11.5 - 17.0 %    Platelet 220 130 - 400 x10(3)/mcL    MPV 11.4 (H) 7.4 - 10.4 fL    Neut % 53.0 %    Lymph % 27.7 %    Mono % 16.6 %    Eos % 1.6 %    Basophil % 0.8 %    Lymph # 1.05 0.6 - 4.6 x10(3)/mcL    Neut # 2.01 (L) 2.1 - 9.2 x10(3)/mcL    Mono # 0.63 0.1 - 1.3 x10(3)/mcL    Eos #  0.06 0 - 0.9 x10(3)/mcL    Baso # 0.03 0 - 0.2 x10(3)/mcL    IG# 0.01 0 - 0.04 x10(3)/mcL    IG% 0.3 %        Assessment:   Locally advanced high-grade prostate cancer - clinical T3 with extraprostatic extension and no evidence of metastatic disease      Plan:   PSA level shows mild interval increase.  His perineal symptoms are neuropathic in nature and likely related to previous treatment.  Continued close observation is recommended.  RTC 3 months for follow-up with CBC, CMP and PSA level prior to visit.  If his PSA level increases to >1.0, will consider a PSMA scan.  He will schedule an appointment with Dr. Early to evaluate his memory loss.  All questions answered.    PARKER MORILLO, FNP-C      Other Physicians  Dr. Yonny Harkins

## 2023-01-17 ENCOUNTER — OFFICE VISIT (OUTPATIENT)
Dept: HEMATOLOGY/ONCOLOGY | Facility: CLINIC | Age: 73
End: 2023-01-17
Payer: MEDICARE

## 2023-01-17 VITALS
OXYGEN SATURATION: 98 % | DIASTOLIC BLOOD PRESSURE: 73 MMHG | TEMPERATURE: 98 F | BODY MASS INDEX: 27.03 KG/M2 | SYSTOLIC BLOOD PRESSURE: 126 MMHG | HEART RATE: 64 BPM | HEIGHT: 69 IN | RESPIRATION RATE: 18 BRPM | WEIGHT: 182.5 LBS

## 2023-01-17 DIAGNOSIS — C61 MALIGNANT NEOPLASM OF PROSTATE: Primary | ICD-10-CM

## 2023-01-17 PROCEDURE — 99213 PR OFFICE/OUTPT VISIT, EST, LEVL III, 20-29 MIN: ICD-10-PCS | Mod: S$PBB,,, | Performed by: NURSE PRACTITIONER

## 2023-01-17 PROCEDURE — 99213 OFFICE O/P EST LOW 20 MIN: CPT | Mod: PBBFAC | Performed by: NURSE PRACTITIONER

## 2023-01-17 PROCEDURE — 99999 PR PBB SHADOW E&M-EST. PATIENT-LVL III: CPT | Mod: PBBFAC,,, | Performed by: NURSE PRACTITIONER

## 2023-01-17 PROCEDURE — 99999 PR PBB SHADOW E&M-EST. PATIENT-LVL III: ICD-10-PCS | Mod: PBBFAC,,, | Performed by: NURSE PRACTITIONER

## 2023-01-17 PROCEDURE — 99213 OFFICE O/P EST LOW 20 MIN: CPT | Mod: S$PBB,,, | Performed by: NURSE PRACTITIONER

## 2023-04-17 ENCOUNTER — LAB VISIT (OUTPATIENT)
Dept: LAB | Facility: HOSPITAL | Age: 73
End: 2023-04-17
Attending: INTERNAL MEDICINE
Payer: MEDICARE

## 2023-04-17 DIAGNOSIS — C61 MALIGNANT NEOPLASM OF PROSTATE: ICD-10-CM

## 2023-04-17 LAB
ALBUMIN SERPL-MCNC: 4.2 G/DL (ref 3.4–4.8)
ALBUMIN/GLOB SERPL: 1.8 RATIO (ref 1.1–2)
ALP SERPL-CCNC: 64 UNIT/L (ref 40–150)
ALT SERPL-CCNC: 20 UNIT/L (ref 0–55)
AST SERPL-CCNC: 25 UNIT/L (ref 5–34)
BASOPHILS # BLD AUTO: 0.01 X10(3)/MCL (ref 0–0.2)
BASOPHILS NFR BLD AUTO: 0.2 %
BILIRUBIN DIRECT+TOT PNL SERPL-MCNC: 0.6 MG/DL
BUN SERPL-MCNC: 13.6 MG/DL (ref 8.4–25.7)
CALCIUM SERPL-MCNC: 9.4 MG/DL (ref 8.8–10)
CHLORIDE SERPL-SCNC: 109 MMOL/L (ref 98–107)
CO2 SERPL-SCNC: 27 MMOL/L (ref 23–31)
CREAT SERPL-MCNC: 0.9 MG/DL (ref 0.73–1.18)
EOSINOPHIL # BLD AUTO: 0.04 X10(3)/MCL (ref 0–0.9)
EOSINOPHIL NFR BLD AUTO: 0.9 %
ERYTHROCYTE [DISTWIDTH] IN BLOOD BY AUTOMATED COUNT: 13.7 % (ref 11.5–17)
GFR SERPLBLD CREATININE-BSD FMLA CKD-EPI: >60 MLS/MIN/1.73/M2
GLOBULIN SER-MCNC: 2.3 GM/DL (ref 2.4–3.5)
GLUCOSE SERPL-MCNC: 100 MG/DL (ref 82–115)
HCT VFR BLD AUTO: 41.6 % (ref 42–52)
HGB BLD-MCNC: 13.1 G/DL (ref 14–18)
IMM GRANULOCYTES # BLD AUTO: 0 X10(3)/MCL (ref 0–0.04)
IMM GRANULOCYTES NFR BLD AUTO: 0 %
LYMPHOCYTES # BLD AUTO: 1.36 X10(3)/MCL (ref 0.6–4.6)
LYMPHOCYTES NFR BLD AUTO: 30.4 %
MCH RBC QN AUTO: 30.8 PG (ref 27–31)
MCHC RBC AUTO-ENTMCNC: 31.5 G/DL (ref 33–36)
MCV RBC AUTO: 97.9 FL (ref 80–94)
MONOCYTES # BLD AUTO: 0.76 X10(3)/MCL (ref 0.1–1.3)
MONOCYTES NFR BLD AUTO: 17 %
NEUTROPHILS # BLD AUTO: 2.3 X10(3)/MCL (ref 2.1–9.2)
NEUTROPHILS NFR BLD AUTO: 51.5 %
PLATELET # BLD AUTO: 202 X10(3)/MCL (ref 130–400)
PMV BLD AUTO: 10.8 FL (ref 7.4–10.4)
POTASSIUM SERPL-SCNC: 4.6 MMOL/L (ref 3.5–5.1)
PROT SERPL-MCNC: 6.5 GM/DL (ref 5.8–7.6)
PSA SERPL-MCNC: 0.31 NG/ML
RBC # BLD AUTO: 4.25 X10(6)/MCL (ref 4.7–6.1)
SODIUM SERPL-SCNC: 143 MMOL/L (ref 136–145)
WBC # SPEC AUTO: 4.5 X10(3)/MCL (ref 4.5–11.5)

## 2023-04-17 PROCEDURE — 80053 COMPREHEN METABOLIC PANEL: CPT

## 2023-04-17 PROCEDURE — 36415 COLL VENOUS BLD VENIPUNCTURE: CPT

## 2023-04-17 PROCEDURE — 85025 COMPLETE CBC W/AUTO DIFF WBC: CPT

## 2023-04-17 PROCEDURE — 84153 ASSAY OF PSA TOTAL: CPT

## 2023-04-19 ENCOUNTER — TELEPHONE (OUTPATIENT)
Dept: INTERNAL MEDICINE | Facility: CLINIC | Age: 73
End: 2023-04-19
Payer: MEDICARE

## 2023-04-19 ENCOUNTER — OFFICE VISIT (OUTPATIENT)
Dept: HEMATOLOGY/ONCOLOGY | Facility: CLINIC | Age: 73
End: 2023-04-19
Payer: MEDICARE

## 2023-04-19 VITALS
OXYGEN SATURATION: 99 % | HEIGHT: 69 IN | DIASTOLIC BLOOD PRESSURE: 73 MMHG | RESPIRATION RATE: 18 BRPM | WEIGHT: 182.19 LBS | BODY MASS INDEX: 26.98 KG/M2 | SYSTOLIC BLOOD PRESSURE: 132 MMHG | HEART RATE: 50 BPM | TEMPERATURE: 98 F

## 2023-04-19 DIAGNOSIS — C61 MALIGNANT NEOPLASM OF PROSTATE: Primary | ICD-10-CM

## 2023-04-19 PROCEDURE — 99213 PR OFFICE/OUTPT VISIT, EST, LEVL III, 20-29 MIN: ICD-10-PCS | Mod: S$PBB,,, | Performed by: NURSE PRACTITIONER

## 2023-04-19 PROCEDURE — 99999 PR PBB SHADOW E&M-EST. PATIENT-LVL III: CPT | Mod: PBBFAC,,, | Performed by: NURSE PRACTITIONER

## 2023-04-19 PROCEDURE — 99213 OFFICE O/P EST LOW 20 MIN: CPT | Mod: PBBFAC | Performed by: NURSE PRACTITIONER

## 2023-04-19 PROCEDURE — 99213 OFFICE O/P EST LOW 20 MIN: CPT | Mod: S$PBB,,, | Performed by: NURSE PRACTITIONER

## 2023-04-19 PROCEDURE — 99999 PR PBB SHADOW E&M-EST. PATIENT-LVL III: ICD-10-PCS | Mod: PBBFAC,,, | Performed by: NURSE PRACTITIONER

## 2023-04-19 NOTE — TELEPHONE ENCOUNTER
----- Message from Debbie Haney LPN sent at 4/18/2023  1:44 PM CDT -----  Regarding: regina Cisneros 4/27 @1:00  Fasting labs needed.

## 2023-04-19 NOTE — PROGRESS NOTES
Subjective:       Patient ID: Matthew Mejia is a 72 y.o. male.    Chief Complaint:  Pelvic pain continues, slightly worse    Diagnosis: Locally advanced, high-grade prostate cancer        + COVID-19 vaccinated    Treatment History  XRT + Lupron completed 3/1/18  Zytiga/Prednisone 3/18-12/19 --> Lupron completed 9/19    Current Treatment: Observation    Clinical History:  Patient has a history of BPH with a mildly elevated PSA level for many years. He had previous benign prostate biopsies in 2008. Routine lab work 6/14/17 showed further increase in his PSA level to 7.5 ng/mL with a serum testosterone of 547 ng/dL. He was treated with antibiotics but his PSA level remained elevated. Ultrasound was abnormal. MRI of the prostate 8/18/17 showed a volume of 38 grams with a 2.2 cm aggressive midline peripheral zone lesion at the base and mid gland. There was obliteration of the right rectal prostatic angle at the mid gland consistent with extracapsular disease and encasement of the intra-prostatic seminal vesicle origins. There was no neurovascular bundle involvement. There was no abnormal pelvic adenopathy.    On 9/20/17, he underwent cystoscopy with bilateral retrogrades, bladder biopsies and ultrasound-guided prostate biopsies. Bladder biopsies showed chronic cystitis with cystitis cystica and no evidence of atypia. Prostate biopsies were positive for adenocarcinoma, Cades's 4+4 = 8 bilaterally with perineural extension on the right. He was evaluated by Dr. Sid Puckett at [facility] for a surgical opinion 10/12/17. Given his extraprostatic extension, primary surgery was not recommended. A bone scan was recommended to complete staging followed by androgen deprivation therapy and EBRT. He was referred to Dr. Martínez for a Radiation Oncology opinion. He underwent a CT PET scan 10/17/17 which showed no abnormal hypermetabolic activity to indicate metastatic disease. Whole-body bone scan 10/19/17 was  negative for abnormal osseous uptake. He was started on Casodex 50 mg daily followed by Lupron prior to starting radiation therapy.    He was seen as a new patient [facility] 10/30/17 for treatment recommendations. Treatment with Zytiga and prednisone in combination with Lupron was recommended for up to 2 years as adjuvant therapy based on results from the STAMPEDE trial showing significant improvement in failure free survival versus androgen deprivation therapy alone. Follow-up PSA level 8/16/18 was <0.01 ng/mL. He developed radiation proctitis requiring treatment with oral steroids. Colonoscopy 9/26/18 showed minor radiation scarring and no evidence of polyps. His prednisone was gradually tapered back to a maintenance dose of 5 mg daily in combination with his Zytiga. PSA level has remained undetectable.    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 He was evaluated by Ortho and referred for physical therapy with improvement in her symptoms. His Lupron was discontinued 9/26/19.  He completed his intended course of Zytiga and Prednisone 12/19.     Interval History   Mr. Mejia is here today by himself for a three-month follow-up appointment.  He is over 3 years out from completing his course of Zytiga and prednisone.  He has chronic neuropathic pain involving the perineum and scrotum.  He feels that his symptoms have increased since his last visit.  He mentioned it to his urologist who felt that it was related to his previous radiation therapy.  As previously discussed, his symptoms are more prominent at night but not associated with changes in bowel or bladder activity.  He has been referred to a spine specialist due to low back and right hip pain.  Laboratory for this visit showed a stable to slightly improved PSA level of 0.31.  CBC and CMP were unremarkable.  Laboratory results reviewed and discussed  today with the patient.       Review of Systems   Constitutional:  Negative for appetite change, fatigue, fever and unexpected weight change.   HENT:  Negative for mouth sores, sore throat and trouble swallowing.    Eyes: Negative.  Negative for visual disturbance.   Respiratory:  Negative for cough, chest tightness and shortness of breath.    Cardiovascular:  Negative for chest pain, palpitations and leg swelling.   Gastrointestinal:  Negative for abdominal distention, abdominal pain, blood in stool, change in bowel habit, constipation, diarrhea, nausea, vomiting and change in bowel habit.   Genitourinary:  Positive for urgency. Negative for difficulty urinating, dysuria and frequency.        Mild perineal discomfort, neuropathic in nature   Musculoskeletal:  Positive for arthralgias and back pain.   Integumentary:  Negative for rash and mole/lesion.   Neurological:  Negative for dizziness, weakness, numbness and headaches.   Hematological:  Negative for adenopathy. Does not bruise/bleed easily.   Psychiatric/Behavioral:  Positive for confusion (memory loss). The patient is not nervous/anxious.        PMHx:  BPH, squamous cell carcinoma of the scalp, shingles  PSHx: Prostate biopsy, carpal tunnel release, tonsillectomy, eye surgery, skin cancer scalp  SH:  Lifetime nonsmoker, + social alcohol use. Retired from the technology industry. Lives in [place] with his wife.  FH:  His father had colon cancer, mother had breast cancer. His paternal grandfather had stomach cancer.       Objective:     Vitals:    04/19/23 0902   BP: 132/73   Pulse: (!) 50   Resp: 18   Temp: 98 °F (36.7 °C)               Physical Exam  Constitutional:       Comments: Elderly, well-developed white male in NAD.   HENT:      Head: Normocephalic.      Nose: Nose normal.      Mouth/Throat:      Mouth: Mucous membranes are moist.      Pharynx: Oropharynx is clear. No posterior oropharyngeal erythema.   Eyes:      Conjunctiva/sclera: Conjunctivae  normal.      Pupils: Pupils are equal, round, and reactive to light.   Cardiovascular:      Rate and Rhythm: Normal rate and regular rhythm.      Heart sounds: No murmur heard.  Pulmonary:      Comments: Lungs clear to auscultation throughout.  Abdominal:      General: Bowel sounds are normal. There is no distension.      Palpations: Abdomen is soft. There is no mass.      Tenderness: There is no abdominal tenderness.   Musculoskeletal:         General: No swelling or tenderness. Normal range of motion.      Cervical back: Normal range of motion.   Lymphadenopathy:      Cervical: No cervical adenopathy.      Upper Body:      Right upper body: No supraclavicular or axillary adenopathy.      Left upper body: No supraclavicular or axillary adenopathy.      Lower Body: No right inguinal adenopathy. No left inguinal adenopathy.   Skin:     General: Skin is warm and dry.   Neurological:      Mental Status: He is alert and oriented to person, place, and time.   Psychiatric:         Mood and Affect: Mood normal.         Behavior: Behavior normal.     ECOG SCORE    1 - Restricted in strenuous activity-ambulatory and able to carry out work of a light nature                 6/16/20   11/4/20   3/29/21  7/28/21   1/24/22  7/8/22   10/11/22   1/10/23   4/17/23  PSA   <0.02       0.07        0.12       0.12        0.20      0.27         0.28        0.35        0.31       Recent Results (from the past 336 hour(s))   Comprehensive Metabolic Panel    Collection Time: 04/17/23  7:33 AM   Result Value Ref Range    Sodium Level 143 136 - 145 mmol/L    Potassium Level 4.6 3.5 - 5.1 mmol/L    Chloride 109 (H) 98 - 107 mmol/L    Carbon Dioxide 27 23 - 31 mmol/L    Glucose Level 100 82 - 115 mg/dL    Blood Urea Nitrogen 13.6 8.4 - 25.7 mg/dL    Creatinine 0.90 0.73 - 1.18 mg/dL    Calcium Level Total 9.4 8.8 - 10.0 mg/dL    Protein Total 6.5 5.8 - 7.6 gm/dL    Albumin Level 4.2 3.4 - 4.8 g/dL    Globulin 2.3 (L) 2.4 - 3.5 gm/dL     Albumin/Globulin Ratio 1.8 1.1 - 2.0 ratio    Bilirubin Total 0.6 <=1.5 mg/dL    Alkaline Phosphatase 64 40 - 150 unit/L    Alanine Aminotransferase 20 0 - 55 unit/L    Aspartate Aminotransferase 25 5 - 34 unit/L    eGFR >60 mls/min/1.73/m2   PSA, Total (Diagnostic)    Collection Time: 04/17/23  7:33 AM   Result Value Ref Range    Prostate Specific Antigen 0.31 <=4.00 ng/mL   CBC with Differential    Collection Time: 04/17/23  7:33 AM   Result Value Ref Range    WBC 4.5 4.5 - 11.5 x10(3)/mcL    RBC 4.25 (L) 4.70 - 6.10 x10(6)/mcL    Hgb 13.1 (L) 14.0 - 18.0 g/dL    Hct 41.6 (L) 42.0 - 52.0 %    MCV 97.9 (H) 80.0 - 94.0 fL    MCH 30.8 27.0 - 31.0 pg    MCHC 31.5 (L) 33.0 - 36.0 g/dL    RDW 13.7 11.5 - 17.0 %    Platelet 202 130 - 400 x10(3)/mcL    MPV 10.8 (H) 7.4 - 10.4 fL    Neut % 51.5 %    Lymph % 30.4 %    Mono % 17.0 %    Eos % 0.9 %    Basophil % 0.2 %    Lymph # 1.36 0.6 - 4.6 x10(3)/mcL    Neut # 2.30 2.1 - 9.2 x10(3)/mcL    Mono # 0.76 0.1 - 1.3 x10(3)/mcL    Eos # 0.04 0 - 0.9 x10(3)/mcL    Baso # 0.01 0 - 0.2 x10(3)/mcL    IG# 0.00 0 - 0.04 x10(3)/mcL    IG% 0.0 %        Assessment:   Locally advanced high-grade prostate cancer - clinical T3 with extraprostatic extension and no evidence of metastatic disease  Chronic neuropathic pain involving perineum and scrotal     Plan:   PSA level is stable to slightly improved.  Pelvic symptoms are chronic and likely multifactorial.  They are not suspicious for disease recurrence.  From an Oncology standpoint, there is no indication for imaging studies at time.  If his PSA level increases to >1.0, will consider a PSMA scan.  Patient in agreement.  RTC 3 months for follow-up with CBC, CMP and PSA level.  All questions answered.    PARKER MORILLO, FNP-C      Other Physicians  Dr. Yonny Harkins

## 2023-04-24 ENCOUNTER — LAB VISIT (OUTPATIENT)
Dept: LAB | Facility: HOSPITAL | Age: 73
End: 2023-04-24
Attending: INTERNAL MEDICINE
Payer: MEDICARE

## 2023-04-24 DIAGNOSIS — H66.90 OTITIS MEDIA, UNSPECIFIED LATERALITY, UNSPECIFIED OTITIS MEDIA TYPE: ICD-10-CM

## 2023-04-24 DIAGNOSIS — E03.9 HYPOTHYROIDISM, UNSPECIFIED TYPE: ICD-10-CM

## 2023-04-24 DIAGNOSIS — E78.5 HYPERLIPIDEMIA, UNSPECIFIED HYPERLIPIDEMIA TYPE: ICD-10-CM

## 2023-04-24 DIAGNOSIS — I65.21 STENOSIS OF RIGHT CAROTID ARTERY: ICD-10-CM

## 2023-04-24 DIAGNOSIS — R73.9 HYPERGLYCEMIA: ICD-10-CM

## 2023-04-24 LAB
CHOLEST SERPL-MCNC: 216 MG/DL
CHOLEST/HDLC SERPL: 3 {RATIO} (ref 0–5)
HDLC SERPL-MCNC: 73 MG/DL (ref 35–60)
LDLC SERPL CALC-MCNC: 131 MG/DL (ref 50–140)
TRIGL SERPL-MCNC: 58 MG/DL (ref 34–140)
TSH SERPL-ACNC: 1.36 UIU/ML (ref 0.35–4.94)
VLDLC SERPL CALC-MCNC: 12 MG/DL

## 2023-04-24 PROCEDURE — 36415 COLL VENOUS BLD VENIPUNCTURE: CPT

## 2023-04-24 PROCEDURE — 80061 LIPID PANEL: CPT

## 2023-04-24 PROCEDURE — 84443 ASSAY THYROID STIM HORMONE: CPT

## 2023-04-27 ENCOUNTER — OFFICE VISIT (OUTPATIENT)
Dept: INTERNAL MEDICINE | Facility: CLINIC | Age: 73
End: 2023-04-27
Payer: MEDICARE

## 2023-04-27 VITALS
HEART RATE: 96 BPM | RESPIRATION RATE: 20 BRPM | SYSTOLIC BLOOD PRESSURE: 122 MMHG | BODY MASS INDEX: 26.86 KG/M2 | OXYGEN SATURATION: 98 % | WEIGHT: 181.38 LBS | HEIGHT: 69 IN | DIASTOLIC BLOOD PRESSURE: 56 MMHG

## 2023-04-27 DIAGNOSIS — E03.9 HYPOTHYROIDISM, UNSPECIFIED TYPE: ICD-10-CM

## 2023-04-27 DIAGNOSIS — C61 PROSTATE CANCER: ICD-10-CM

## 2023-04-27 DIAGNOSIS — E78.5 HYPERLIPIDEMIA, UNSPECIFIED HYPERLIPIDEMIA TYPE: ICD-10-CM

## 2023-04-27 DIAGNOSIS — Z00.00 WELLNESS EXAMINATION: Primary | ICD-10-CM

## 2023-04-27 DIAGNOSIS — R41.3 MEMORY LOSS: ICD-10-CM

## 2023-04-27 DIAGNOSIS — M25.559 HIP PAIN, UNSPECIFIED LATERALITY: ICD-10-CM

## 2023-04-27 PROBLEM — I65.21 STENOSIS OF RIGHT CAROTID ARTERY: Status: RESOLVED | Noted: 2022-08-23 | Resolved: 2023-04-27

## 2023-04-27 PROCEDURE — 99212 PR OFFICE/OUTPT VISIT, EST, LEVL II, 10-19 MIN: ICD-10-PCS | Mod: 25,,, | Performed by: INTERNAL MEDICINE

## 2023-04-27 PROCEDURE — G0439 PPPS, SUBSEQ VISIT: HCPCS | Mod: ,,, | Performed by: INTERNAL MEDICINE

## 2023-04-27 PROCEDURE — G0439 PR MEDICARE ANNUAL WELLNESS SUBSEQUENT VISIT: ICD-10-PCS | Mod: ,,, | Performed by: INTERNAL MEDICINE

## 2023-04-27 PROCEDURE — 99212 OFFICE O/P EST SF 10 MIN: CPT | Mod: 25,,, | Performed by: INTERNAL MEDICINE

## 2023-04-27 RX ORDER — MULTIVIT WITH MINERALS/HERBS
1 TABLET ORAL DAILY
COMMUNITY
End: 2023-07-24

## 2023-04-27 RX ORDER — MULTIVITAMIN/IRON/FOLIC ACID 18MG-0.4MG
TABLET ORAL
COMMUNITY
Start: 2021-01-01

## 2023-04-27 RX ORDER — AMOXICILLIN 500 MG
CAPSULE ORAL DAILY
COMMUNITY

## 2023-04-27 RX ORDER — ACETAMINOPHEN 500 MG
5 TABLET ORAL NIGHTLY
COMMUNITY
End: 2023-10-25

## 2023-04-27 NOTE — PROGRESS NOTES
Subjective:      Patient Care Team:  Yonny Early MD as PCP - General (Internal Medicine)  Yonny Early MD      Patient ID: Matthew Mejia is a 72 y.o. male.    Chief Complaint: Follow-up (The pt has hip pain and also noticing memory loss. No other problems noted at this time. )      The patient is a 72-year-old man in for wellness check.  He also has some new issues to discuss.      Complaining of hip pain.  Right-sided.  He did see eventually the orthopedic doctor.  He was considering MRI scanning if the oncologist thought it necessary.  He did not.  It was felt not to be related to his prostate cancer.  He has since been referred to Dr. Burch for possible treatment of lumbar spondylosis.    Also concerned about memory loss.  Some cognitive decline.  He does similar complaints last year and evaluation at that time was negative for treatable causes of dementia.  This included B12 levels as well as routine other studies and a CT scan of the brain.    Follow-up    Review of Systems   All other systems reviewed and are negative.     Current Outpatient Medications on File Prior to Visit   Medication Sig Dispense Refill    alfuzosin (UROXATRAL) 10 mg Tb24 Take 10 mg by mouth once daily.      aspirin (ECOTRIN) 81 MG EC tablet Take 81 mg by mouth once daily.      b complex vitamins tablet Take 1 tablet by mouth once daily.      calcium phosphate-vitamin D3 125 mg-3.125 mcg (125 unit) Chew Take by mouth once.      glucosamine/chondr jennings A sod (GLUCOSAMINE-CHONDROITIN) 1,500-1,200 mg/30 mL Liqd       levothyroxine (SYNTHROID) 75 MCG tablet Take 1 tablet (75 mcg total) by mouth once daily. 90 tablet 3    melatonin (MELATIN) 5 mg Take 5 mg by mouth every evening.      omega-3 fatty acids/fish oil (FISH OIL-OMEGA-3 FATTY ACIDS) 300-1,000 mg capsule Take by mouth once daily.       No current facility-administered medications on file prior to visit.       Patient Reported Health Risk Assessment    "    Opioid Screening: Patient medication list reviewed, patient is not taking prescription opioids. Patient is not using additional opioids than prescribed. Patient is at low risk of substance abuse based on this opioid use history.       Objective:      BP (!) 122/56 (BP Location: Right arm, Patient Position: Sitting, BP Method: Large (Manual))   Pulse 96   Resp 20   Ht 5' 9" (1.753 m)   Wt 82.3 kg (181 lb 6.4 oz)   SpO2 98%   BMI 26.79 kg/m²     Physical Exam  Vitals reviewed.   Constitutional:       Appearance: Normal appearance.   HENT:      Head: Normocephalic and atraumatic.      Right Ear: Tympanic membrane normal.      Left Ear: Tympanic membrane normal.      Mouth/Throat:      Pharynx: Oropharynx is clear.   Eyes:      Pupils: Pupils are equal, round, and reactive to light.   Neck:      Vascular: No carotid bruit.   Cardiovascular:      Rate and Rhythm: Normal rate and regular rhythm.      Pulses: Normal pulses.      Heart sounds: Normal heart sounds.   Pulmonary:      Effort: Pulmonary effort is normal.      Breath sounds: Normal breath sounds.   Abdominal:      General: Abdomen is flat.      Palpations: Abdomen is soft. There is no mass.      Tenderness: There is no abdominal tenderness. There is no guarding.   Musculoskeletal:         General: No swelling.      Cervical back: Neck supple.   Lymphadenopathy:      Cervical: No cervical adenopathy.   Skin:     General: Skin is warm and dry.   Neurological:      General: No focal deficit present.      Mental Status: He is alert and oriented to person, place, and time.     Lab work reviewed      No flowsheet data found.  Fall Risk Assessment - Outpatient 4/27/2023 10/18/2022 10/12/2022 8/23/2022 10/12/2017   Mobility Status Ambulatory Ambulatory Ambulatory Ambulatory -   Number of falls 0 0 0 0 0   Identified as fall risk 0 0 0 0 0                Assessment:       1. Wellness    2. Hyperlipidemia.  Decent control    3. Corrected hypothyroidism.  On " Synthroid    4. Prostate cancer.  Followed elsewhere.  Seems to be doing well without evidence of metastatic disease    5. Hip pain.  Thought to be referred from his back.  Previous x-ray show degenerative changes.      6. Cognitive change.  Relatively mild and slowly worsening.  No treatable causes noted on recent workup.  Plan:     Continue same meds TLC etc..  Follow-up with me 6 months with CBC CMP lipid TSH          Medicare Annual Wellness and Personalized Prevention Plan:   Fall Risk + Home Safety + Hearing Impairment + Depression Screen + Cognitive Impairment Screen + Health Risk Assessment all reviewed.       Health Maintenance Topics with due status: Not Due       Topic Last Completion Date    Colorectal Cancer Screening 06/02/2022    Lipid Panel 04/24/2023    Aspirin/Antiplatelet Therapy 04/27/2023      The patient's Health Maintenance was reviewed and the following appears to be due at this time:   Health Maintenance Due   Topic Date Due    Hepatitis C Screening  Never done    TETANUS VACCINE  Never done    Hemoglobin A1c (Diabetic Prevention Screening)  Never done    Shingles Vaccine (1 of 2) 12/25/2010    COVID-19 Vaccine (4 - Booster for Pfizer series) 11/22/2021    Influenza Vaccine (1) 09/01/2022         Advance Care Planning   I attest to discussing Advance Care Planning with patient and/or family member.  Education was provided including the importance of the Health Care Power of , Advance Directives, and/or [place] documentation.  The patient expressed understanding to the importance of this information and discussion.  Length of ACP conversation in minutes:          Follow up in about 6 months (around 10/27/2023). In addition to their scheduled follow up, the patient has also been instructed to follow up on as needed basis.

## 2023-07-17 ENCOUNTER — TELEPHONE (OUTPATIENT)
Dept: INTERNAL MEDICINE | Facility: CLINIC | Age: 73
End: 2023-07-17
Payer: MEDICARE

## 2023-07-17 NOTE — TELEPHONE ENCOUNTER
----- Message from Debbie Haney LPN sent at 7/17/2023  1:07 PM CDT -----  Regarding: regina Cisneros 7/24 @11:00  No labs needed.

## 2023-07-18 NOTE — PROGRESS NOTES
Subjective:       Patient ID: Matthew Mejia is a 72 y.o. male.    Chief Complaint:  Poor short-term memory    Diagnosis: Locally advanced, high-grade prostate cancer        + COVID-19 vaccinated    Treatment History  XRT + Lupron completed 3/1/18  Zytiga/Prednisone 3/18-12/19 --> Lupron completed 9/19    Current Treatment: Observation    Clinical History:  Patient has a history of BPH with a mildly elevated PSA level for many years. He had previous benign prostate biopsies in 2008. Routine lab work 6/14/17 showed further increase in his PSA level to 7.5 ng/mL with a serum testosterone of 547 ng/dL. He was treated with antibiotics but his PSA level remained elevated. Ultrasound was abnormal. MRI of the prostate 8/18/17 showed a volume of 38 grams with a 2.2 cm aggressive midline peripheral zone lesion at the base and mid gland. There was obliteration of the right rectal prostatic angle at the mid gland consistent with extracapsular disease and encasement of the intra-prostatic seminal vesicle origins. There was no neurovascular bundle involvement. There was no abnormal pelvic adenopathy.    On 9/20/17, he underwent cystoscopy with bilateral retrogrades, bladder biopsies and ultrasound-guided prostate biopsies. Bladder biopsies showed chronic cystitis with cystitis cystica and no evidence of atypia. Prostate biopsies were positive for adenocarcinoma, Ugo's 4+4 = 8 bilaterally with perineural extension on the right. He was evaluated by Dr. Sid Puckett at [facility] for a surgical opinion 10/12/17. Given his extraprostatic extension, primary surgery was not recommended. A bone scan was recommended to complete staging followed by androgen deprivation therapy and EBRT. He was referred to Dr. Martínez for a Radiation Oncology opinion. He underwent a CT PET scan 10/17/17 which showed no abnormal hypermetabolic activity to indicate metastatic disease. Whole-body bone scan 10/19/17 was negative for  abnormal osseous uptake. He was started on Casodex 50 mg daily followed by Lupron prior to starting radiation therapy.    He was seen as a new patient at [facility] 10/30/17 for treatment recommendations. Treatment with Zytiga and prednisone in combination with Lupron was recommended for up to 2 years as adjuvant therapy based on results from the STAMPEDE trial showing significant improvement in failure free survival versus androgen deprivation therapy alone. Follow-up PSA level 8/16/18 was <0.01 ng/mL. He developed radiation proctitis requiring treatment with oral steroids. Colonoscopy 9/26/18 showed minor radiation scarring and no evidence of polyps. His prednisone was gradually tapered back to a maintenance dose of 5 mg daily in combination with his Zytiga. PSA level has remained undetectable.    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 He was evaluated by Ortho and referred for physical therapy with improvement in her symptoms. His Lupron was discontinued 9/26/19.  He completed his intended course of Zytiga and Prednisone 12/19.  PSA level has remained suppressed off of hormonal therapy.    Interval History   He returns to the office today by himself for a three-month follow-up visit.  PSA level remains low off of hormonal suppression showing no significant progression.  He has intermittent neuropathic symptoms involving the perineum and scrotum, more on the left side.  His symptoms are stable.  He also has intermittent hip pain.  Previous imaging showed bilateral avascular necrosis.  He is followed by Orthopedics.  He complains of poor short-term memory.  He has an appointment with his internist later today.  He remains anxious regarding the possibility of disease recurrence.       Review of Systems   Constitutional:  Negative for appetite change, fatigue, fever and unexpected weight change.   HENT:   Negative for mouth sores, sore throat and trouble swallowing.    Eyes: Negative.    Respiratory:  Negative for cough and shortness of breath.    Cardiovascular:  Negative for chest pain, palpitations and leg swelling.   Gastrointestinal:  Negative for abdominal distention, abdominal pain, constipation, diarrhea, nausea and vomiting.   Genitourinary:  Positive for urgency. Negative for difficulty urinating, dysuria and frequency.        Mild perineal discomfort, neuropathic in nature   Musculoskeletal:  Positive for arthralgias and back pain.   Integumentary:  Negative for pallor and rash.   Neurological:  Negative for dizziness, weakness, numbness and headaches.   Hematological:  Negative for adenopathy. Does not bruise/bleed easily.   Psychiatric/Behavioral:  Negative for confusion. The patient is nervous/anxious.         Poor short-term memory       PMHx:  BPH, squamous cell carcinoma of the scalp, shingles  PSHx: Prostate biopsy, carpal tunnel release, tonsillectomy, eye surgery, skin cancer scalp  SH:  Lifetime nonsmoker, + social alcohol use. Retired from the technology industry. Lives in [place] with his wife.  FH:  His father had colon cancer, mother had breast cancer. His paternal grandfather had stomach cancer.       Objective:     Vitals:    07/24/23 0841   BP: 129/65   Pulse: (!) 52   Resp: 16   Temp: 97.7 °F (36.5 °C)       Physical Exam  Constitutional:       Appearance: Normal appearance.      Comments: Elderly, well-developed white male in NAD.   HENT:      Head: Normocephalic.      Mouth/Throat:      Mouth: Mucous membranes are moist.      Pharynx: Oropharynx is clear. No posterior oropharyngeal erythema.   Eyes:      Extraocular Movements: Extraocular movements intact.      Conjunctiva/sclera: Conjunctivae normal.      Pupils: Pupils are equal, round, and reactive to light.   Cardiovascular:      Rate and Rhythm: Normal rate and regular rhythm.      Heart sounds: No murmur heard.  Pulmonary:       Comments: Lungs clear to auscultation.  Abdominal:      General: Bowel sounds are normal. There is no distension.      Palpations: Abdomen is soft. There is no mass.      Tenderness: There is no abdominal tenderness.   Musculoskeletal:         General: No swelling or tenderness. Normal range of motion.      Cervical back: Neck supple. No tenderness.   Skin:     General: Skin is warm and dry.      Findings: No rash.   Neurological:      General: No focal deficit present.      Mental Status: He is alert and oriented to person, place, and time.      Cranial Nerves: No cranial nerve deficit.      Motor: No weakness.     ECOG SCORE    0 - Fully active-able to carry on all pre-disease performance without restriction             LABORATORY  Recent Results (from the past 336 hour(s))   Comprehensive Metabolic Panel    Collection Time: 07/20/23  7:34 AM   Result Value Ref Range    Sodium Level 143 136 - 145 mmol/L    Potassium Level 4.8 3.5 - 5.1 mmol/L    Chloride 110 (H) 98 - 107 mmol/L    Carbon Dioxide 24 23 - 31 mmol/L    Glucose Level 93 82 - 115 mg/dL    Blood Urea Nitrogen 16.5 8.4 - 25.7 mg/dL    Creatinine 0.98 0.73 - 1.18 mg/dL    Calcium Level Total 9.6 8.8 - 10.0 mg/dL    Protein Total 6.5 5.8 - 7.6 gm/dL    Albumin Level 4.3 3.4 - 4.8 g/dL    Globulin 2.2 (L) 2.4 - 3.5 gm/dL    Albumin/Globulin Ratio 2.0 1.1 - 2.0 ratio    Bilirubin Total 0.9 <=1.5 mg/dL    Alkaline Phosphatase 57 40 - 150 unit/L    Alanine Aminotransferase 24 0 - 55 unit/L    Aspartate Aminotransferase 25 5 - 34 unit/L    eGFR >60 mls/min/1.73/m2   PSA, Total (Diagnostic)    Collection Time: 07/20/23  7:34 AM   Result Value Ref Range    Prostate Specific Antigen 0.23 <=4.00 ng/mL   CBC with Differential    Collection Time: 07/20/23  7:34 AM   Result Value Ref Range    WBC 3.98 (L) 4.50 - 11.50 x10(3)/mcL    RBC 4.39 (L) 4.70 - 6.10 x10(6)/mcL    Hgb 13.6 (L) 14.0 - 18.0 g/dL    Hct 42.6 42.0 - 52.0 %    MCV 97.0 (H) 80.0 - 94.0 fL    MCH 31.0  27.0 - 31.0 pg    MCHC 31.9 (L) 33.0 - 36.0 g/dL    RDW 13.3 11.5 - 17.0 %    Platelet 215 130 - 400 x10(3)/mcL    MPV 10.8 (H) 7.4 - 10.4 fL    Neut % 56.0 %    Lymph % 26.9 %    Mono % 15.3 %    Eos % 1.3 %    Basophil % 0.5 %    Lymph # 1.07 0.6 - 4.6 x10(3)/mcL    Neut # 2.23 2.1 - 9.2 x10(3)/mcL    Mono # 0.61 0.1 - 1.3 x10(3)/mcL    Eos # 0.05 0 - 0.9 x10(3)/mcL    Baso # 0.02 <=0.2 x10(3)/mcL    IG# 0.00 0 - 0.04 x10(3)/mcL    IG% 0.0 %                6/16/20   11/4/20   3/29/21  7/28/21   1/24/22  7/8/22   10/11/22   1/10/23   4/17/23   7/20/23  PSA   <0.02       0.07        0.12       0.12        0.20      0.27         0.28        0.35        0.31        0.23       Assessment:   Locally advanced high-grade prostate cancer - clinical T3 with extraprostatic extension and no evidence of metastatic disease  Chronic neuropathic pain involving perineum and scrotum    Plan:   PSA level remains suppressed and shows interval decrease.  He does not have any symptoms concerning for disease progression.  He will maintain follow-up with orthopedics and Internal Medicine for his other symptoms.  RTC in 3 months for a follow-up visit with a CMP and PSA level.  Consider PSMA scan if PSA rises to >1.0 ng/mL.      EDEN SANTIAGO MD    Other Physicians  Dr. Eden Harkins

## 2023-07-20 ENCOUNTER — LAB VISIT (OUTPATIENT)
Dept: LAB | Facility: HOSPITAL | Age: 73
End: 2023-07-20
Attending: INTERNAL MEDICINE
Payer: MEDICARE

## 2023-07-20 DIAGNOSIS — C61 MALIGNANT NEOPLASM OF PROSTATE: ICD-10-CM

## 2023-07-20 LAB
ALBUMIN SERPL-MCNC: 4.3 G/DL (ref 3.4–4.8)
ALBUMIN/GLOB SERPL: 2 RATIO (ref 1.1–2)
ALP SERPL-CCNC: 57 UNIT/L (ref 40–150)
ALT SERPL-CCNC: 24 UNIT/L (ref 0–55)
AST SERPL-CCNC: 25 UNIT/L (ref 5–34)
BASOPHILS # BLD AUTO: 0.02 X10(3)/MCL
BASOPHILS NFR BLD AUTO: 0.5 %
BILIRUBIN DIRECT+TOT PNL SERPL-MCNC: 0.9 MG/DL
BUN SERPL-MCNC: 16.5 MG/DL (ref 8.4–25.7)
CALCIUM SERPL-MCNC: 9.6 MG/DL (ref 8.8–10)
CHLORIDE SERPL-SCNC: 110 MMOL/L (ref 98–107)
CO2 SERPL-SCNC: 24 MMOL/L (ref 23–31)
CREAT SERPL-MCNC: 0.98 MG/DL (ref 0.73–1.18)
EOSINOPHIL # BLD AUTO: 0.05 X10(3)/MCL (ref 0–0.9)
EOSINOPHIL NFR BLD AUTO: 1.3 %
ERYTHROCYTE [DISTWIDTH] IN BLOOD BY AUTOMATED COUNT: 13.3 % (ref 11.5–17)
GFR SERPLBLD CREATININE-BSD FMLA CKD-EPI: >60 MLS/MIN/1.73/M2
GLOBULIN SER-MCNC: 2.2 GM/DL (ref 2.4–3.5)
GLUCOSE SERPL-MCNC: 93 MG/DL (ref 82–115)
HCT VFR BLD AUTO: 42.6 % (ref 42–52)
HGB BLD-MCNC: 13.6 G/DL (ref 14–18)
IMM GRANULOCYTES # BLD AUTO: 0 X10(3)/MCL (ref 0–0.04)
IMM GRANULOCYTES NFR BLD AUTO: 0 %
LYMPHOCYTES # BLD AUTO: 1.07 X10(3)/MCL (ref 0.6–4.6)
LYMPHOCYTES NFR BLD AUTO: 26.9 %
MCH RBC QN AUTO: 31 PG (ref 27–31)
MCHC RBC AUTO-ENTMCNC: 31.9 G/DL (ref 33–36)
MCV RBC AUTO: 97 FL (ref 80–94)
MONOCYTES # BLD AUTO: 0.61 X10(3)/MCL (ref 0.1–1.3)
MONOCYTES NFR BLD AUTO: 15.3 %
NEUTROPHILS # BLD AUTO: 2.23 X10(3)/MCL (ref 2.1–9.2)
NEUTROPHILS NFR BLD AUTO: 56 %
PLATELET # BLD AUTO: 215 X10(3)/MCL (ref 130–400)
PMV BLD AUTO: 10.8 FL (ref 7.4–10.4)
POTASSIUM SERPL-SCNC: 4.8 MMOL/L (ref 3.5–5.1)
PROT SERPL-MCNC: 6.5 GM/DL (ref 5.8–7.6)
PSA SERPL-MCNC: 0.23 NG/ML
RBC # BLD AUTO: 4.39 X10(6)/MCL (ref 4.7–6.1)
SODIUM SERPL-SCNC: 143 MMOL/L (ref 136–145)
WBC # SPEC AUTO: 3.98 X10(3)/MCL (ref 4.5–11.5)

## 2023-07-20 PROCEDURE — 85025 COMPLETE CBC W/AUTO DIFF WBC: CPT

## 2023-07-20 PROCEDURE — 36415 COLL VENOUS BLD VENIPUNCTURE: CPT

## 2023-07-20 PROCEDURE — 84153 ASSAY OF PSA TOTAL: CPT

## 2023-07-20 PROCEDURE — 80053 COMPREHEN METABOLIC PANEL: CPT

## 2023-07-24 ENCOUNTER — OFFICE VISIT (OUTPATIENT)
Dept: HEMATOLOGY/ONCOLOGY | Facility: CLINIC | Age: 73
End: 2023-07-24
Payer: MEDICARE

## 2023-07-24 ENCOUNTER — OFFICE VISIT (OUTPATIENT)
Dept: INTERNAL MEDICINE | Facility: CLINIC | Age: 73
End: 2023-07-24
Payer: MEDICARE

## 2023-07-24 VITALS
RESPIRATION RATE: 16 BRPM | SYSTOLIC BLOOD PRESSURE: 129 MMHG | BODY MASS INDEX: 27.15 KG/M2 | HEART RATE: 52 BPM | DIASTOLIC BLOOD PRESSURE: 65 MMHG | WEIGHT: 183.31 LBS | TEMPERATURE: 98 F | OXYGEN SATURATION: 97 % | HEIGHT: 69 IN

## 2023-07-24 VITALS
WEIGHT: 183.19 LBS | OXYGEN SATURATION: 97 % | HEIGHT: 69 IN | SYSTOLIC BLOOD PRESSURE: 119 MMHG | BODY MASS INDEX: 27.13 KG/M2 | DIASTOLIC BLOOD PRESSURE: 67 MMHG | HEART RATE: 59 BPM | RESPIRATION RATE: 18 BRPM

## 2023-07-24 DIAGNOSIS — J30.9 ALLERGIC RHINITIS, UNSPECIFIED SEASONALITY, UNSPECIFIED TRIGGER: ICD-10-CM

## 2023-07-24 DIAGNOSIS — H93.11 TINNITUS AURIUM, RIGHT: ICD-10-CM

## 2023-07-24 DIAGNOSIS — E78.5 HYPERLIPIDEMIA, UNSPECIFIED HYPERLIPIDEMIA TYPE: Primary | ICD-10-CM

## 2023-07-24 DIAGNOSIS — C61 MALIGNANT NEOPLASM OF PROSTATE: Primary | ICD-10-CM

## 2023-07-24 DIAGNOSIS — C61 PROSTATE CANCER: ICD-10-CM

## 2023-07-24 DIAGNOSIS — R73.9 HYPERGLYCEMIA: ICD-10-CM

## 2023-07-24 DIAGNOSIS — R41.3 OTHER AMNESIA: ICD-10-CM

## 2023-07-24 DIAGNOSIS — R41.3 MEMORY LOSS: ICD-10-CM

## 2023-07-24 DIAGNOSIS — E03.9 HYPOTHYROIDISM, UNSPECIFIED TYPE: ICD-10-CM

## 2023-07-24 PROCEDURE — 99999 PR PBB SHADOW E&M-EST. PATIENT-LVL IV: CPT | Mod: PBBFAC,,, | Performed by: INTERNAL MEDICINE

## 2023-07-24 PROCEDURE — 99214 OFFICE O/P EST MOD 30 MIN: CPT | Mod: PBBFAC | Performed by: INTERNAL MEDICINE

## 2023-07-24 PROCEDURE — 99214 OFFICE O/P EST MOD 30 MIN: CPT | Mod: ,,, | Performed by: INTERNAL MEDICINE

## 2023-07-24 PROCEDURE — 99214 PR OFFICE/OUTPT VISIT, EST, LEVL IV, 30-39 MIN: ICD-10-PCS | Mod: ,,, | Performed by: INTERNAL MEDICINE

## 2023-07-24 PROCEDURE — 99214 OFFICE O/P EST MOD 30 MIN: CPT | Mod: S$PBB,,, | Performed by: INTERNAL MEDICINE

## 2023-07-24 PROCEDURE — 99999 PR PBB SHADOW E&M-EST. PATIENT-LVL IV: ICD-10-PCS | Mod: PBBFAC,,, | Performed by: INTERNAL MEDICINE

## 2023-07-24 PROCEDURE — 99214 PR OFFICE/OUTPT VISIT, EST, LEVL IV, 30-39 MIN: ICD-10-PCS | Mod: S$PBB,,, | Performed by: INTERNAL MEDICINE

## 2023-07-24 RX ORDER — GLUCOSAMINE/CHONDR SU A SOD 750-600 MG
TABLET ORAL
COMMUNITY
Start: 2023-04-17 | End: 2024-02-07

## 2023-07-24 NOTE — PROGRESS NOTES
Subjective:       Patient ID: Matthew Mejia is a 72 y.o. male.    Chief Complaint: Pain (Pain to the left side of prostate, memory loss, tinnitus in left ear.)      The patient is a 72-year-old man in with a number of complaints.  He mentioned some sticking or needle pain in the prostate area.  He did discuss that with his oncologist.  [place] to be related to radiation treatment.  Perhaps related to his spine as well he does have a history of lumbar spondylosis.  He has seen Dr. Burch for that problem.    Also complaining of cognitive decline.  He had this complaint over a year ago at which time we did B12 levels thyroid levels in a CT of the head.  Everything was fine.  He was reassured.  His symptoms seemed to have gradually worsened.    Also complaining of ringing in the right ear.  Upon questioning he has some minor or less ringing in the left.  He does have some issues with allergic rhinitis and sinus drip.    Pain    Dysuria   This is a new problem. The current episode started 1 to 4 weeks ago. The problem occurs intermittently. The problem has been waxing and waning. The quality of the pain is described as aching. The pain is at a severity of 3/10. The pain is mild. There has been no fever. He is Not sexually active. There is No history of pyelonephritis. Associated symptoms include frequency. He has tried increased fluids for the symptoms. The treatment provided mild relief. His past medical history is significant for catheterization and a urological procedure. There is no history of diabetes insipidus, diabetes mellitus, genitourinary reflux, hypertension, recurrent UTIs, a single kidney, STD or urinary stasis.   Review of Systems   Genitourinary:  Positive for dysuria and frequency.      Current Outpatient Medications on File Prior to Visit   Medication Sig Dispense Refill    alfuzosin (UROXATRAL) 10 mg Tb24 Take 10 mg by mouth once daily.      aspirin (ECOTRIN) 81 MG EC tablet Take 81 mg by mouth  "once daily.      calcium phosphate-vitamin D3 125 mg-3.125 mcg (125 unit) Chew Take by mouth once.      cholecalciferol, vitamin D3, (D3-2000 ORAL)       ginkgo biloba leaf extract 120 mg Cap       glucosamine/chondr jennings A sod (GLUCOSAMINE-CHONDROITIN) 1,500-1,200 mg/30 mL Liqd       levothyroxine (SYNTHROID) 75 MCG tablet Take 1 tablet (75 mcg total) by mouth once daily. 90 tablet 3    melatonin (MELATIN) 5 mg Take 5 mg by mouth every evening.      omega-3 fatty acids/fish oil (FISH OIL-OMEGA-3 FATTY ACIDS) 300-1,000 mg capsule Take by mouth once daily.      [DISCONTINUED] b complex vitamins tablet Take 1 tablet by mouth once daily.       No current facility-administered medications on file prior to visit.     Objective:      /67 (BP Location: Right arm, Patient Position: Sitting, BP Method: Large (Manual))   Pulse (!) 59   Resp 18   Ht 5' 9" (1.753 m)   Wt 83.1 kg (183 lb 3.2 oz)   SpO2 97%   BMI 27.05 kg/m²     Physical Exam    Assessment:       1. Cognitive decline.  Nonspecific.  In the setting of unilateral tinnitus 1 has to worry about more severe CNS disease.    2. Tinnitus.  Likely related to allergic rhinitis     3. History of prostate cancer.  No evidence recurrence    4. Hyperlipidemia.  Numbers were good at last check.  His most recent blood test by Oncology were fine.      5. Corrected hypothyroidism       Plan:     Will do MMSE today and also arrange have an MRI of the brain done     Add Flonase 2 puffs each nostril daily.  If not better after 2 weeks will add Singulair 10 mg daily.  Will try to avoid antihistamines because of his prostate issues.    Follow-up with me as scheduled.  This will be in a few months.            "

## 2023-07-25 DIAGNOSIS — C61 MALIGNANT NEOPLASM OF PROSTATE: Primary | ICD-10-CM

## 2023-08-15 ENCOUNTER — HOSPITAL ENCOUNTER (OUTPATIENT)
Dept: RADIOLOGY | Facility: HOSPITAL | Age: 73
Discharge: HOME | End: 2023-08-15
Attending: INTERNAL MEDICINE
Payer: MEDICARE

## 2023-08-15 DIAGNOSIS — H93.11 TINNITUS AURIUM, RIGHT: ICD-10-CM

## 2023-08-15 DIAGNOSIS — R41.3 MEMORY LOSS: ICD-10-CM

## 2023-08-15 PROCEDURE — A9577 INJ MULTIHANCE: HCPCS | Performed by: INTERNAL MEDICINE

## 2023-08-15 PROCEDURE — 25500020 PHARM REV CODE 255: Performed by: INTERNAL MEDICINE

## 2023-08-15 PROCEDURE — 70553 MRI BRAIN STEM W/O & W/DYE: CPT | Mod: TC

## 2023-08-15 RX ADMIN — GADOBENATE DIMEGLUMINE 17 ML: 529 INJECTION, SOLUTION INTRAVENOUS at 11:08

## 2023-08-16 ENCOUNTER — TELEPHONE (OUTPATIENT)
Dept: INTERNAL MEDICINE | Facility: CLINIC | Age: 73
End: 2023-08-16
Payer: MEDICARE

## 2023-08-16 NOTE — TELEPHONE ENCOUNTER
----- Message from Yonny Early MD sent at 8/15/2023  5:10 PM CDT -----  Tell him MRI brain normal.  ----- Message -----  From: Doris, Rad Results In  Sent: 8/15/2023   4:41 PM CDT  To: Yonny Early MD

## 2023-10-20 ENCOUNTER — LAB VISIT (OUTPATIENT)
Dept: LAB | Facility: HOSPITAL | Age: 73
End: 2023-10-20
Attending: INTERNAL MEDICINE
Payer: MEDICARE

## 2023-10-20 DIAGNOSIS — C61 MALIGNANT NEOPLASM OF PROSTATE: ICD-10-CM

## 2023-10-20 LAB
ALBUMIN SERPL-MCNC: 4.2 G/DL (ref 3.4–4.8)
ALBUMIN/GLOB SERPL: 1.8 RATIO (ref 1.1–2)
ALP SERPL-CCNC: 61 UNIT/L (ref 40–150)
ALT SERPL-CCNC: 22 UNIT/L (ref 0–55)
AST SERPL-CCNC: 23 UNIT/L (ref 5–34)
BASOPHILS # BLD AUTO: 0.02 X10(3)/MCL
BASOPHILS NFR BLD AUTO: 0.5 %
BILIRUB SERPL-MCNC: 0.7 MG/DL
BUN SERPL-MCNC: 13.3 MG/DL (ref 8.4–25.7)
CALCIUM SERPL-MCNC: 9.5 MG/DL (ref 8.8–10)
CHLORIDE SERPL-SCNC: 108 MMOL/L (ref 98–107)
CO2 SERPL-SCNC: 29 MMOL/L (ref 23–31)
CREAT SERPL-MCNC: 0.87 MG/DL (ref 0.73–1.18)
EOSINOPHIL # BLD AUTO: 0.03 X10(3)/MCL (ref 0–0.9)
EOSINOPHIL NFR BLD AUTO: 0.8 %
ERYTHROCYTE [DISTWIDTH] IN BLOOD BY AUTOMATED COUNT: 13 % (ref 11.5–17)
GFR SERPLBLD CREATININE-BSD FMLA CKD-EPI: >60 MLS/MIN/1.73/M2
GLOBULIN SER-MCNC: 2.3 GM/DL (ref 2.4–3.5)
GLUCOSE SERPL-MCNC: 129 MG/DL (ref 82–115)
HCT VFR BLD AUTO: 42.7 % (ref 42–52)
HGB BLD-MCNC: 13.3 G/DL (ref 14–18)
IMM GRANULOCYTES # BLD AUTO: 0.01 X10(3)/MCL (ref 0–0.04)
IMM GRANULOCYTES NFR BLD AUTO: 0.3 %
LYMPHOCYTES # BLD AUTO: 0.9 X10(3)/MCL (ref 0.6–4.6)
LYMPHOCYTES NFR BLD AUTO: 22.8 %
MCH RBC QN AUTO: 30.4 PG (ref 27–31)
MCHC RBC AUTO-ENTMCNC: 31.1 G/DL (ref 33–36)
MCV RBC AUTO: 97.7 FL (ref 80–94)
MONOCYTES # BLD AUTO: 0.62 X10(3)/MCL (ref 0.1–1.3)
MONOCYTES NFR BLD AUTO: 15.7 %
NEUTROPHILS # BLD AUTO: 2.36 X10(3)/MCL (ref 2.1–9.2)
NEUTROPHILS NFR BLD AUTO: 59.9 %
PLATELET # BLD AUTO: 215 X10(3)/MCL (ref 130–400)
PMV BLD AUTO: 10.5 FL (ref 7.4–10.4)
POTASSIUM SERPL-SCNC: 4.3 MMOL/L (ref 3.5–5.1)
PROT SERPL-MCNC: 6.5 GM/DL (ref 5.8–7.6)
PSA SERPL-MCNC: 0.27 NG/ML
RBC # BLD AUTO: 4.37 X10(6)/MCL (ref 4.7–6.1)
SODIUM SERPL-SCNC: 142 MMOL/L (ref 136–145)
WBC # SPEC AUTO: 3.94 X10(3)/MCL (ref 4.5–11.5)

## 2023-10-20 PROCEDURE — 85025 COMPLETE CBC W/AUTO DIFF WBC: CPT

## 2023-10-20 PROCEDURE — 36415 COLL VENOUS BLD VENIPUNCTURE: CPT

## 2023-10-20 PROCEDURE — 84153 ASSAY OF PSA TOTAL: CPT

## 2023-10-20 PROCEDURE — 80053 COMPREHEN METABOLIC PANEL: CPT

## 2023-10-24 ENCOUNTER — TELEPHONE (OUTPATIENT)
Dept: INTERNAL MEDICINE | Facility: CLINIC | Age: 73
End: 2023-10-24
Payer: MEDICARE

## 2023-10-24 NOTE — TELEPHONE ENCOUNTER
----- Message from Debbie Haney LPN sent at 10/24/2023  8:42 AM CDT -----  Regarding: regina Cisneros 11/1 @1:20  Fasting labs needed.

## 2023-10-25 ENCOUNTER — OFFICE VISIT (OUTPATIENT)
Dept: HEMATOLOGY/ONCOLOGY | Facility: CLINIC | Age: 73
End: 2023-10-25
Payer: MEDICARE

## 2023-10-25 VITALS
BODY MASS INDEX: 27.45 KG/M2 | OXYGEN SATURATION: 97 % | HEART RATE: 59 BPM | WEIGHT: 185.31 LBS | TEMPERATURE: 98 F | SYSTOLIC BLOOD PRESSURE: 139 MMHG | DIASTOLIC BLOOD PRESSURE: 77 MMHG | HEIGHT: 69 IN | RESPIRATION RATE: 16 BRPM

## 2023-10-25 DIAGNOSIS — C61 MALIGNANT NEOPLASM OF PROSTATE: Primary | ICD-10-CM

## 2023-10-25 DIAGNOSIS — R41.3 POOR SHORT TERM MEMORY: ICD-10-CM

## 2023-10-25 PROCEDURE — 99999 PR PBB SHADOW E&M-EST. PATIENT-LVL IV: ICD-10-PCS | Mod: PBBFAC,,, | Performed by: NURSE PRACTITIONER

## 2023-10-25 PROCEDURE — 99999 PR PBB SHADOW E&M-EST. PATIENT-LVL IV: CPT | Mod: PBBFAC,,, | Performed by: NURSE PRACTITIONER

## 2023-10-25 PROCEDURE — 99214 OFFICE O/P EST MOD 30 MIN: CPT | Mod: PBBFAC | Performed by: NURSE PRACTITIONER

## 2023-10-25 PROCEDURE — 99213 OFFICE O/P EST LOW 20 MIN: CPT | Mod: S$PBB,,, | Performed by: NURSE PRACTITIONER

## 2023-10-25 PROCEDURE — 99213 PR OFFICE/OUTPT VISIT, EST, LEVL III, 20-29 MIN: ICD-10-PCS | Mod: S$PBB,,, | Performed by: NURSE PRACTITIONER

## 2023-10-25 NOTE — PROGRESS NOTES
Subjective:       Patient ID: Matthew Mejia is a 73 y.o. male.    Chief Complaint:  Poor short-term memory, ongoing pain in scrotal region    Diagnosis: Locally advanced, high-grade prostate cancer        + COVID-19 vaccinated    Treatment History  XRT + Lupron completed 3/1/18  Zytiga/Prednisone 3/18-12/19 --> Lupron completed 9/19    Current Treatment: Observation    Clinical History:  Patient has a history of BPH with a mildly elevated PSA level for many years. He had previous benign prostate biopsies in 2008. Routine lab work 6/14/17 showed further increase in his PSA level to 7.5 ng/mL with a serum testosterone of 547 ng/dL. He was treated with antibiotics but his PSA level remained elevated. Ultrasound was abnormal. MRI of the prostate 8/18/17 showed a volume of 38 grams with a 2.2 cm aggressive midline peripheral zone lesion at the base and mid gland. There was obliteration of the right rectal prostatic angle at the mid gland consistent with extracapsular disease and encasement of the intra-prostatic seminal vesicle origins. There was no neurovascular bundle involvement. There was no abnormal pelvic adenopathy.    On 9/20/17, he underwent cystoscopy with bilateral retrogrades, bladder biopsies and ultrasound-guided prostate biopsies. Bladder biopsies showed chronic cystitis with cystitis cystica and no evidence of atypia. Prostate biopsies were positive for adenocarcinoma, Ugo's 4+4 = 8 bilaterally with perineural extension on the right. He was evaluated by Dr. Sid Puckett at [facility] for a surgical opinion 10/12/17. Given his extraprostatic extension, primary surgery was not recommended. A bone scan was recommended to complete staging followed by androgen deprivation therapy and EBRT. He was referred to Dr. Martínez for a Radiation Oncology opinion. He underwent a CT PET scan 10/17/17 which showed no abnormal hypermetabolic activity to indicate metastatic disease. Whole-body bone  scan 10/19/17 was negative for abnormal osseous uptake. He was started on Casodex 50 mg daily followed by Lupron prior to starting radiation therapy.    He was seen as a new patient at [facility] 10/30/17 for treatment recommendations. Treatment with Zytiga and prednisone in combination with Lupron was recommended for up to 2 years as adjuvant therapy based on results from the STAMPEDE trial showing significant improvement in failure free survival versus androgen deprivation therapy alone. Follow-up PSA level 8/16/18 was <0.01 ng/mL. He developed radiation proctitis requiring treatment with oral steroids. Colonoscopy 9/26/18 showed minor radiation scarring and no evidence of polyps. His prednisone was gradually tapered back to a maintenance dose of 5 mg daily in combination with his Zytiga. PSA level has remained undetectable.    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 He was evaluated by Ortho and referred for physical therapy with improvement in her symptoms. His Lupron was discontinued 9/26/19.  He completed his intended course of Zytiga and Prednisone 12/19.  PSA level has remained suppressed off of hormonal therapy.    Interval History   Mr. Mejia is here today by himself for a three-month follow-up appointment.  He feels well overall.  He complains of chronic perineal pain.  These symptoms date back to completion of radiation therapy.  He is most aware of the discomfort at night.  During the day he is busy and essentially unaware.  He has chronic, stable urinary symptoms, specifically weak stream and occasional difficulty initiating a stream (worse if he consumes alcohol).  He denies any dysuria or hematuria.  Laboratory shows a stable, low PSA level of 0.27 with an unremarkable CMP.  Results discussed with patient today and reassurance provided.  He was referred for an MRI of the brain by Internal  Medicine due to complaints of poor short-term memory.  The test was performed on 08/15/2023 showing normal findings for his age.  He has an upcoming appointment with Dr. Early for routine wellness/follow-up and plans to discuss pharmacologic therapies that may be helpful to him.      Review of Systems   Constitutional:  Negative for appetite change, fatigue, fever and unexpected weight change.   HENT:  Negative for mouth sores, sore throat and trouble swallowing.    Eyes: Negative.    Respiratory:  Negative for cough and shortness of breath.    Cardiovascular:  Negative for chest pain, palpitations and leg swelling.   Gastrointestinal:  Negative for abdominal distention, abdominal pain, constipation, diarrhea, nausea and vomiting.   Genitourinary:  Positive for urgency. Negative for difficulty urinating, dysuria and frequency.        Mild perineal discomfort, neuropathic in nature.  Weak stream at times   Musculoskeletal:  Positive for arthralgias and back pain.   Integumentary:  Negative for pallor and rash.   Neurological:  Negative for dizziness, weakness, numbness and headaches.   Hematological:  Negative for adenopathy. Does not bruise/bleed easily.   Psychiatric/Behavioral:  Negative for confusion. The patient is nervous/anxious.         Poor short-term memory         PMHx:  BPH, squamous cell carcinoma of the scalp, shingles  PSHx: Prostate biopsy, carpal tunnel release, tonsillectomy, eye surgery, skin cancer scalp  SH:  Lifetime nonsmoker, + social alcohol use. 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  FH:  His father had colon cancer, mother had breast cancer. His paternal grandfather had stomach cancer.       Objective:     Vitals:    10/25/23 1420   BP: 139/77   Pulse: (!) 59   Resp: 16   Temp: 98.2 °F (36.8 °C)       Physical Exam  Constitutional:       Appearance: Normal appearance.      Comments: Elderly, well-developed white male in NAD.   HENT:      Head:  Normocephalic.      Mouth/Throat:      Mouth: Mucous membranes are moist.      Pharynx: Oropharynx is clear. No posterior oropharyngeal erythema.   Eyes:      Extraocular Movements: Extraocular movements intact.      Conjunctiva/sclera: Conjunctivae normal.      Pupils: Pupils are equal, round, and reactive to light.   Cardiovascular:      Rate and Rhythm: Normal rate and regular rhythm.      Heart sounds: No murmur heard.  Pulmonary:      Comments: Lungs clear to auscultation.  Abdominal:      General: Bowel sounds are normal. There is no distension.      Palpations: Abdomen is soft. There is no mass.      Tenderness: There is no abdominal tenderness.   Musculoskeletal:         General: No swelling or tenderness. Normal range of motion.      Cervical back: Neck supple. No tenderness.   Skin:     General: Skin is warm and dry.      Findings: No rash.   Neurological:      General: No focal deficit present.      Mental Status: He is alert and oriented to person, place, and time.      Cranial Nerves: No cranial nerve deficit.      Motor: No weakness.   Psychiatric:         Mood and Affect: Mood normal.         Behavior: Behavior normal.       ECOG SCORE    1 - Restricted in strenuous activity-ambulatory and able to carry out work of a light nature             LABORATORY  Recent Results (from the past 336 hour(s))   Comprehensive Metabolic Panel    Collection Time: 10/20/23 10:05 AM   Result Value Ref Range    Sodium Level 142 136 - 145 mmol/L    Potassium Level 4.3 3.5 - 5.1 mmol/L    Chloride 108 (H) 98 - 107 mmol/L    Carbon Dioxide 29 23 - 31 mmol/L    Glucose Level 129 (H) 82 - 115 mg/dL    Blood Urea Nitrogen 13.3 8.4 - 25.7 mg/dL    Creatinine 0.87 0.73 - 1.18 mg/dL    Calcium Level Total 9.5 8.8 - 10.0 mg/dL    Protein Total 6.5 5.8 - 7.6 gm/dL    Albumin Level 4.2 3.4 - 4.8 g/dL    Globulin 2.3 (L) 2.4 - 3.5 gm/dL    Albumin/Globulin Ratio 1.8 1.1 - 2.0 ratio    Bilirubin Total 0.7 <=1.5 mg/dL    Alkaline  Phosphatase 61 40 - 150 unit/L    Alanine Aminotransferase 22 0 - 55 unit/L    Aspartate Aminotransferase 23 5 - 34 unit/L    eGFR >60 mls/min/1.73/m2   PSA, Total (Diagnostic)    Collection Time: 10/20/23 10:05 AM   Result Value Ref Range    Prostate Specific Antigen 0.27 <=4.00 ng/mL   CBC with Differential    Collection Time: 10/20/23 10:05 AM   Result Value Ref Range    WBC 3.94 (L) 4.50 - 11.50 x10(3)/mcL    RBC 4.37 (L) 4.70 - 6.10 x10(6)/mcL    Hgb 13.3 (L) 14.0 - 18.0 g/dL    Hct 42.7 42.0 - 52.0 %    MCV 97.7 (H) 80.0 - 94.0 fL    MCH 30.4 27.0 - 31.0 pg    MCHC 31.1 (L) 33.0 - 36.0 g/dL    RDW 13.0 11.5 - 17.0 %    Platelet 215 130 - 400 x10(3)/mcL    MPV 10.5 (H) 7.4 - 10.4 fL    Neut % 59.9 %    Lymph % 22.8 %    Mono % 15.7 %    Eos % 0.8 %    Basophil % 0.5 %    Lymph # 0.90 0.6 - 4.6 x10(3)/mcL    Neut # 2.36 2.1 - 9.2 x10(3)/mcL    Mono # 0.62 0.1 - 1.3 x10(3)/mcL    Eos # 0.03 0 - 0.9 x10(3)/mcL    Baso # 0.02 <=0.2 x10(3)/mcL    IG# 0.01 0 - 0.04 x10(3)/mcL    IG% 0.3 %                11/4/20   3/29/21  7/28/21   1/24/22  7/8/22   10/11/22   1/10/23   4/17/23   7/20/23   10/20/23  PSA    0.07        0.12       0.12        0.20      0.27         0.28        0.35        0.31        0.23        0.27       Assessment:   Locally advanced high-grade prostate cancer - clinical T3 with extraprostatic extension and no evidence of metastatic disease  Chronic neuropathic pain involving perineum and scrotum  Poor short term memory    Plan:   PSA level remains suppressed and without significant interval change since 1/22.  Reassurance was provided to the patient given the stability of his PSA level, lack of progressive symptoms and normal alkaline phosphatase level.  He will maintain follow-up with Internal Medicine to address his routine wellness in complaints of progressive memory issues.  RTC in 3 months for a follow-up visit with a CMP and PSA level.  Consider PSMA scan if PSA rises to >1.0 ng/mL.  Patient  in agreement with the treatment plan as outlined above.  All questions answered.    PARKER MORILLO, FNP-C  [facility] [place]     Other Physicians  Dr. Yonny Harkins

## 2023-11-01 ENCOUNTER — OFFICE VISIT (OUTPATIENT)
Dept: INTERNAL MEDICINE | Facility: CLINIC | Age: 73
End: 2023-11-01
Payer: MEDICARE

## 2023-11-01 ENCOUNTER — DOCUMENTATION ONLY (OUTPATIENT)
Dept: INTERNAL MEDICINE | Facility: CLINIC | Age: 73
End: 2023-11-01

## 2023-11-01 VITALS
HEART RATE: 74 BPM | WEIGHT: 187.63 LBS | HEIGHT: 69 IN | DIASTOLIC BLOOD PRESSURE: 85 MMHG | RESPIRATION RATE: 18 BRPM | BODY MASS INDEX: 27.79 KG/M2 | SYSTOLIC BLOOD PRESSURE: 143 MMHG | OXYGEN SATURATION: 99 %

## 2023-11-01 DIAGNOSIS — R73.9 HYPERGLYCEMIA: ICD-10-CM

## 2023-11-01 DIAGNOSIS — R41.3 MEMORY LOSS: ICD-10-CM

## 2023-11-01 DIAGNOSIS — C61 MALIGNANT NEOPLASM OF PROSTATE: ICD-10-CM

## 2023-11-01 DIAGNOSIS — E78.5 HYPERLIPIDEMIA, UNSPECIFIED HYPERLIPIDEMIA TYPE: Primary | ICD-10-CM

## 2023-11-01 DIAGNOSIS — H93.19 TINNITUS, UNSPECIFIED LATERALITY: ICD-10-CM

## 2023-11-01 DIAGNOSIS — E03.9 HYPOTHYROIDISM, UNSPECIFIED TYPE: ICD-10-CM

## 2023-11-01 PROCEDURE — 99214 OFFICE O/P EST MOD 30 MIN: CPT | Mod: ,,, | Performed by: INTERNAL MEDICINE

## 2023-11-01 PROCEDURE — 99214 PR OFFICE/OUTPT VISIT, EST, LEVL IV, 30-39 MIN: ICD-10-PCS | Mod: ,,, | Performed by: INTERNAL MEDICINE

## 2023-11-01 NOTE — PROGRESS NOTES
"Subjective:       Patient ID: Matthew Mejia is a 73 y.o. male.    Chief Complaint: Follow-up      The patient is a 73-year-old man in for follow-up of multiple problems.  His wife came in with him today.  He is still complaining of cognitive decline.  His wife confirms that his memory is getting worse.  He has some confusion.  He repeats himself frequently.    He also complains of tinnitus.    Also complaining of some vague discomfort right chest wall.  Worse in the morning and goes away during the day.  Not associated with exercise.  He does sleep on his stomach on multiple pillows to help arch his back.    Follow-up      Review of Systems     Current Outpatient Medications on File Prior to Visit   Medication Sig Dispense Refill    alfuzosin (UROXATRAL) 10 mg Tb24 Take 10 mg by mouth once daily.      aspirin (ECOTRIN) 81 MG EC tablet Take 81 mg by mouth once daily.      calcium phosphate-vitamin D3 125 mg-3.125 mcg (125 unit) Chew Take by mouth once.      cholecalciferol, vitamin D3, (D3-2000 ORAL)       ginkgo biloba leaf extract 120 mg Cap       glucosamine/chondr jennings A sod (GLUCOSAMINE-CHONDROITIN) 1,500-1,200 mg/30 mL Liqd       levothyroxine (SYNTHROID) 75 MCG tablet Take 1 tablet (75 mcg total) by mouth once daily. 90 tablet 3    omega-3 fatty acids/fish oil (FISH OIL-OMEGA-3 FATTY ACIDS) 300-1,000 mg capsule Take by mouth once daily.       No current facility-administered medications on file prior to visit.     Objective:      BP (!) 143/85 (BP Location: Right arm, Patient Position: Sitting, BP Method: Large (Manual))   Pulse 74   Resp 18   Ht 5' 9" (1.753 m)   Wt 85.1 kg (187 lb 9.6 oz)   SpO2 99%   BMI 27.70 kg/m²     Physical Exam  Vitals reviewed.   Constitutional:       Appearance: Normal appearance.   HENT:      Head: Normocephalic and atraumatic.      Right Ear: Tympanic membrane normal.      Left Ear: Tympanic membrane normal.      Mouth/Throat:      Pharynx: Oropharynx is clear.   Eyes: "      Pupils: Pupils are equal, round, and reactive to light.   Neck:      Vascular: No carotid bruit.   Cardiovascular:      Rate and Rhythm: Normal rate and regular rhythm.      Pulses: Normal pulses.      Heart sounds: Normal heart sounds.   Pulmonary:      Effort: Pulmonary effort is normal.      Breath sounds: Normal breath sounds.   Abdominal:      General: Abdomen is flat.      Palpations: Abdomen is soft. There is no mass.      Tenderness: There is no abdominal tenderness. There is no guarding.   Musculoskeletal:         General: No swelling.      Cervical back: Neck supple.   Lymphadenopathy:      Cervical: No cervical adenopathy.   Skin:     General: Skin is warm and dry.   Neurological:      General: No focal deficit present.      Mental Status: He is alert and oriented to person, place, and time.       Lab work reviewed.  Incomplete and not done fasting.  Assessment:       1. Cognitive decline.  Need to try medications     2. Hyperglycemia.  Likely due to nonfasting testing with his recent chemistry profile     3. Corrected hypothyroidism.  On Synthroid    4. History of hyperlipidemia.  Not on meds currently    5. Tinnitus.  Nonspecific    6. History of prostate cancer.  No evidence some spread    7. Atypical chest pain.  Most likely musculoskeletal from odd sleeping position.    Plan:     Add Aricept 5 daily.  Warned of potential side effects.      Refer to ENT.    Follow-up with me 3 months with CBC CMP lipid TSH prior

## 2024-01-03 DIAGNOSIS — E03.9 HYPOTHYROIDISM, UNSPECIFIED TYPE: ICD-10-CM

## 2024-01-03 RX ORDER — LEVOTHYROXINE SODIUM 75 UG/1
75 TABLET ORAL DAILY
Qty: 90 TABLET | Refills: 3 | Status: SHIPPED | OUTPATIENT
Start: 2024-01-03

## 2024-01-22 ENCOUNTER — PATIENT OUTREACH (OUTPATIENT)
Dept: ADMINISTRATIVE | Facility: HOSPITAL | Age: 74
End: 2024-01-22
Payer: MEDICARE

## 2024-01-22 NOTE — LETTER
AUTHORIZATION FOR RELEASE OF   CONFIDENTIAL INFORMATION    Dear Chilo Walters MD,    We are seeing Matthew Mejia, date of birth 1950, in the clinic at [place] [address] [address] [facility]. Yonny Early MD is the patient's PCP. Matthew Mejia has an outstanding lab/procedure at the time we reviewed his chart. In order to help keep his health information updated, he has authorized us to request the following medical record(s):                                COLONOSCOPY over this last 10 years            Please fax records to [facility], Alexander, Michael S, MD, [phone].     If you have any questions, please contact Ade at [phone].           Patient Name: Matthew Mejia  : 1950  Patient Phone #: [phone]

## 2024-01-29 ENCOUNTER — LAB VISIT (OUTPATIENT)
Dept: LAB | Facility: HOSPITAL | Age: 74
End: 2024-01-29
Attending: INTERNAL MEDICINE
Payer: MEDICARE

## 2024-01-29 DIAGNOSIS — C61 MALIGNANT NEOPLASM OF PROSTATE: ICD-10-CM

## 2024-01-29 LAB
ALBUMIN SERPL-MCNC: 4.1 G/DL (ref 3.4–4.8)
ALBUMIN/GLOB SERPL: 1.8 RATIO (ref 1.1–2)
ALP SERPL-CCNC: 71 UNIT/L (ref 40–150)
ALT SERPL-CCNC: 28 UNIT/L (ref 0–55)
AST SERPL-CCNC: 28 UNIT/L (ref 5–34)
BILIRUB SERPL-MCNC: 0.4 MG/DL
BUN SERPL-MCNC: 15.2 MG/DL (ref 8.4–25.7)
CALCIUM SERPL-MCNC: 9.4 MG/DL (ref 8.8–10)
CHLORIDE SERPL-SCNC: 109 MMOL/L (ref 98–107)
CO2 SERPL-SCNC: 28 MMOL/L (ref 23–31)
CREAT SERPL-MCNC: 0.86 MG/DL (ref 0.73–1.18)
GFR SERPLBLD CREATININE-BSD FMLA CKD-EPI: >60 MLS/MIN/1.73/M2
GLOBULIN SER-MCNC: 2.3 GM/DL (ref 2.4–3.5)
GLUCOSE SERPL-MCNC: 86 MG/DL (ref 82–115)
POTASSIUM SERPL-SCNC: 4.4 MMOL/L (ref 3.5–5.1)
PROT SERPL-MCNC: 6.4 GM/DL (ref 5.8–7.6)
PSA SERPL-MCNC: 0.27 NG/ML
SODIUM SERPL-SCNC: 143 MMOL/L (ref 136–145)

## 2024-01-29 PROCEDURE — 84153 ASSAY OF PSA TOTAL: CPT

## 2024-01-29 PROCEDURE — 36415 COLL VENOUS BLD VENIPUNCTURE: CPT

## 2024-01-29 PROCEDURE — 80053 COMPREHEN METABOLIC PANEL: CPT

## 2024-01-30 ENCOUNTER — TELEPHONE (OUTPATIENT)
Dept: INTERNAL MEDICINE | Facility: CLINIC | Age: 74
End: 2024-01-30
Payer: MEDICARE

## 2024-01-30 NOTE — TELEPHONE ENCOUNTER
----- Message from Debbie Haney LPN sent at 1/30/2024 11:56 AM CST -----  Regarding: regina Torres 2/7 @2:20  Fasting labs needed.

## 2024-02-02 RX ORDER — AZELASTINE 1 MG/ML
1 SPRAY, METERED NASAL 2 TIMES DAILY
COMMUNITY
Start: 2023-12-07 | End: 2024-05-16 | Stop reason: SDUPTHER

## 2024-02-05 ENCOUNTER — TELEPHONE (OUTPATIENT)
Dept: INTERNAL MEDICINE | Facility: CLINIC | Age: 74
End: 2024-02-05
Payer: MEDICARE

## 2024-02-05 ENCOUNTER — OFFICE VISIT (OUTPATIENT)
Dept: HEMATOLOGY/ONCOLOGY | Facility: CLINIC | Age: 74
End: 2024-02-05
Payer: MEDICARE

## 2024-02-05 VITALS
DIASTOLIC BLOOD PRESSURE: 77 MMHG | OXYGEN SATURATION: 98 % | BODY MASS INDEX: 27.55 KG/M2 | HEIGHT: 69 IN | SYSTOLIC BLOOD PRESSURE: 131 MMHG | RESPIRATION RATE: 16 BRPM | HEART RATE: 58 BPM | WEIGHT: 186 LBS | TEMPERATURE: 99 F

## 2024-02-05 DIAGNOSIS — C61 MALIGNANT NEOPLASM OF PROSTATE: Primary | ICD-10-CM

## 2024-02-05 PROCEDURE — 99214 OFFICE O/P EST MOD 30 MIN: CPT | Mod: PBBFAC | Performed by: NURSE PRACTITIONER

## 2024-02-05 PROCEDURE — 99214 OFFICE O/P EST MOD 30 MIN: CPT | Mod: S$PBB,,, | Performed by: NURSE PRACTITIONER

## 2024-02-05 PROCEDURE — 99999 PR PBB SHADOW E&M-EST. PATIENT-LVL IV: CPT | Mod: PBBFAC,,, | Performed by: NURSE PRACTITIONER

## 2024-02-05 NOTE — TELEPHONE ENCOUNTER
----- Message from Yao Rodriguez sent at 2/5/2024  2:30 PM CST -----  .Type:  Needs Medical Advice    Who Called: Matthew  Symptoms (please be specific):    How long has patient had these symptoms:    Pharmacy name and phone #:    Would the patient rather a call back or a response via [facility]?   Best Call Back Number: [phone]  Additional Information: He would like a call back from nurse requesting the addition of a lead detecting blood test in the blood work he is doing tomorrow morning.

## 2024-02-06 ENCOUNTER — LAB VISIT (OUTPATIENT)
Dept: LAB | Facility: HOSPITAL | Age: 74
End: 2024-02-06
Attending: INTERNAL MEDICINE
Payer: MEDICARE

## 2024-02-06 DIAGNOSIS — C61 MALIGNANT NEOPLASM OF PROSTATE: ICD-10-CM

## 2024-02-06 DIAGNOSIS — R73.9 HYPERGLYCEMIA: ICD-10-CM

## 2024-02-06 DIAGNOSIS — E78.5 HYPERLIPIDEMIA, UNSPECIFIED HYPERLIPIDEMIA TYPE: ICD-10-CM

## 2024-02-06 DIAGNOSIS — R78.71 ABNORMAL LEAD LEVEL IN BLOOD: ICD-10-CM

## 2024-02-06 DIAGNOSIS — E03.9 HYPOTHYROIDISM, UNSPECIFIED TYPE: ICD-10-CM

## 2024-02-06 DIAGNOSIS — R41.3 MEMORY LOSS: ICD-10-CM

## 2024-02-06 DIAGNOSIS — R78.71 ABNORMAL LEAD LEVEL IN BLOOD: Primary | ICD-10-CM

## 2024-02-06 LAB
ALBUMIN SERPL-MCNC: 4.1 G/DL (ref 3.4–4.8)
ALBUMIN/GLOB SERPL: 1.7 RATIO (ref 1.1–2)
ALP SERPL-CCNC: 69 UNIT/L (ref 40–150)
ALT SERPL-CCNC: 31 UNIT/L (ref 0–55)
AST SERPL-CCNC: 28 UNIT/L (ref 5–34)
BASOPHILS # BLD AUTO: 0.02 X10(3)/MCL
BASOPHILS NFR BLD AUTO: 0.5 %
BILIRUB SERPL-MCNC: 0.6 MG/DL
BUN SERPL-MCNC: 12.7 MG/DL (ref 8.4–25.7)
CALCIUM SERPL-MCNC: 9.2 MG/DL (ref 8.8–10)
CHLORIDE SERPL-SCNC: 111 MMOL/L (ref 98–107)
CHOLEST SERPL-MCNC: 220 MG/DL
CHOLEST/HDLC SERPL: 3 {RATIO} (ref 0–5)
CO2 SERPL-SCNC: 26 MMOL/L (ref 23–31)
CREAT SERPL-MCNC: 0.87 MG/DL (ref 0.73–1.18)
EOSINOPHIL # BLD AUTO: 0.02 X10(3)/MCL (ref 0–0.9)
EOSINOPHIL NFR BLD AUTO: 0.5 %
ERYTHROCYTE [DISTWIDTH] IN BLOOD BY AUTOMATED COUNT: 13.3 % (ref 11.5–17)
GFR SERPLBLD CREATININE-BSD FMLA CKD-EPI: >60 MLS/MIN/1.73/M2
GLOBULIN SER-MCNC: 2.4 GM/DL (ref 2.4–3.5)
GLUCOSE SERPL-MCNC: 96 MG/DL (ref 82–115)
HCT VFR BLD AUTO: 40.6 % (ref 42–52)
HDLC SERPL-MCNC: 64 MG/DL (ref 35–60)
HGB BLD-MCNC: 13.4 G/DL (ref 14–18)
IMM GRANULOCYTES # BLD AUTO: 0.01 X10(3)/MCL (ref 0–0.04)
IMM GRANULOCYTES NFR BLD AUTO: 0.3 %
LDLC SERPL CALC-MCNC: 141 MG/DL (ref 50–140)
LYMPHOCYTES # BLD AUTO: 0.94 X10(3)/MCL (ref 0.6–4.6)
LYMPHOCYTES NFR BLD AUTO: 24.7 %
MCH RBC QN AUTO: 31.7 PG (ref 27–31)
MCHC RBC AUTO-ENTMCNC: 33 G/DL (ref 33–36)
MCV RBC AUTO: 96 FL (ref 80–94)
MONOCYTES # BLD AUTO: 0.52 X10(3)/MCL (ref 0.1–1.3)
MONOCYTES NFR BLD AUTO: 13.7 %
NEUTROPHILS # BLD AUTO: 2.29 X10(3)/MCL (ref 2.1–9.2)
NEUTROPHILS NFR BLD AUTO: 60.3 %
NRBC BLD AUTO-RTO: 0 %
PLATELET # BLD AUTO: 219 X10(3)/MCL (ref 130–400)
PMV BLD AUTO: 11.4 FL (ref 7.4–10.4)
POTASSIUM SERPL-SCNC: 4.6 MMOL/L (ref 3.5–5.1)
PROT SERPL-MCNC: 6.5 GM/DL (ref 5.8–7.6)
RBC # BLD AUTO: 4.23 X10(6)/MCL (ref 4.7–6.1)
SODIUM SERPL-SCNC: 144 MMOL/L (ref 136–145)
TRIGL SERPL-MCNC: 77 MG/DL (ref 34–140)
TSH SERPL-ACNC: 0.83 UIU/ML (ref 0.35–4.94)
VLDLC SERPL CALC-MCNC: 15 MG/DL
WBC # SPEC AUTO: 3.8 X10(3)/MCL (ref 4.5–11.5)

## 2024-02-06 PROCEDURE — 80061 LIPID PANEL: CPT

## 2024-02-06 PROCEDURE — 82300 ASSAY OF CADMIUM: CPT

## 2024-02-06 PROCEDURE — 80053 COMPREHEN METABOLIC PANEL: CPT

## 2024-02-06 PROCEDURE — 36415 COLL VENOUS BLD VENIPUNCTURE: CPT

## 2024-02-06 PROCEDURE — 84443 ASSAY THYROID STIM HORMONE: CPT

## 2024-02-06 PROCEDURE — 85025 COMPLETE CBC W/AUTO DIFF WBC: CPT

## 2024-02-07 ENCOUNTER — OFFICE VISIT (OUTPATIENT)
Dept: INTERNAL MEDICINE | Facility: CLINIC | Age: 74
End: 2024-02-07
Payer: MEDICARE

## 2024-02-07 VITALS
BODY MASS INDEX: 27.7 KG/M2 | DIASTOLIC BLOOD PRESSURE: 70 MMHG | WEIGHT: 187 LBS | HEIGHT: 69 IN | SYSTOLIC BLOOD PRESSURE: 124 MMHG | HEART RATE: 62 BPM | RESPIRATION RATE: 18 BRPM | OXYGEN SATURATION: 97 %

## 2024-02-07 DIAGNOSIS — R41.3 MEMORY LOSS: Primary | ICD-10-CM

## 2024-02-07 DIAGNOSIS — R73.9 HYPERGLYCEMIA: ICD-10-CM

## 2024-02-07 DIAGNOSIS — C61 MALIGNANT NEOPLASM OF PROSTATE: ICD-10-CM

## 2024-02-07 DIAGNOSIS — E78.5 HYPERLIPIDEMIA, UNSPECIFIED HYPERLIPIDEMIA TYPE: ICD-10-CM

## 2024-02-07 DIAGNOSIS — R10.30 INGUINAL PAIN, UNSPECIFIED LATERALITY: ICD-10-CM

## 2024-02-07 DIAGNOSIS — E03.9 HYPOTHYROIDISM, UNSPECIFIED TYPE: ICD-10-CM

## 2024-02-07 PROBLEM — M87.059 ASEPTIC NECROSIS OF FEMORAL HEAD: Status: RESOLVED | Noted: 2022-08-23 | Resolved: 2024-02-07

## 2024-02-07 LAB
ADDRESS: NORMAL
ARSENIC BLD-MCNC: <1 NG/ML
ATTENDING PHYSICIAN NAME: NORMAL
CADMIUM BLD-MCNC: <0.2 NG/ML
COUNTY OF RESIDENCE: NORMAL
EMPLOYER NAME: NORMAL
FACILITY PHONE #: NORMAL
HX OF OCCUPATION: NORMAL
LEAD BLDV-MCNC: 2.1 MCG/DL
M HEALTH CARE PROVIDER PHONE: NORMAL
M PATIENT CITY: NORMAL
MERCURY BLD-MCNC: 1 NG/ML
PHONE #: NORMAL
POSTAL CODE: NORMAL
PROVIDER CITY: NORMAL
PROVIDER POSTAL CODE: NORMAL
PROVIDER STATE: NORMAL
REFER PHYSICIAN ADDR: NORMAL
SPECIMEN SOURCE: NORMAL
STATE OF RESIDENCE: NORMAL

## 2024-02-07 PROCEDURE — 99214 OFFICE O/P EST MOD 30 MIN: CPT | Mod: ,,, | Performed by: INTERNAL MEDICINE

## 2024-02-07 RX ORDER — DONEPEZIL HYDROCHLORIDE 5 MG/1
5 TABLET, FILM COATED ORAL NIGHTLY
Qty: 30 TABLET | Refills: 11 | Status: SHIPPED | OUTPATIENT
Start: 2024-02-07 | End: 2024-05-16 | Stop reason: SDUPTHER

## 2024-02-07 NOTE — PROGRESS NOTES
Subjective:       Patient ID: Matthew Mejia is a 73 y.o. male.    Chief Complaint: Follow-up      Is a 73-year-old man who comes in for follow-up.  He was here 3 months ago and he was scored poorly on MMSE and he was started on Aricept.  He did not take it because he read that it could cause problems with urine flow.  He already has an issue with that because of remote history of prostate cancer and radiation therapy thereof.  He was followed by Dr. Aramis waldrop.    He did see the medical oncologist recently and they recommended Aricept.  We discussed potential side effects and felt the risk of  symptoms was relatively low.  Also he was side effects he can stop the drug immediately.    Also complaining of a pens and needles sensation in his groin at night.  Is often wakes him up.    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      Review of Systems     Current Outpatient Medications on File Prior to Visit   Medication Sig Dispense Refill    alfuzosin (UROXATRAL) 10 mg Tb24 Take 10 mg by mouth once daily.      aspirin (ECOTRIN) 81 MG EC tablet Take 81 mg by mouth once daily.      azelastine (ASTELIN) 137 mcg (0.1 %) nasal spray 1 spray by Nasal route 2 (two) times daily.      cholecalciferol, vitamin D3, (D3-2000 ORAL)       glucosamine/chondr jennings A sod (GLUCOSAMINE-CHONDROITIN) 1,500-1,200 mg/30 mL Liqd       levothyroxine (SYNTHROID) 75 MCG tablet Take 1 tablet (75 mcg total) by mouth once daily. 90 tablet 3    multivit-mins/iron/folic/lycop (CENTRUM MEN ORAL) Daily.      omega-3 fatty acids/fish oil (FISH OIL-OMEGA-3 FATTY ACIDS) 300-1,000 mg capsule Take by mouth once daily.      UNABLE TO FIND Focus Factor      UNABLE TO FIND Prevagen ES      [DISCONTINUED] ginkgo biloba leaf extract 120 mg Cap        No current facility-administered medications on file prior to visit.     Objective:      /70 (BP Location: Right arm, Patient Position: Sitting, BP Method:  "Large (Manual))   Pulse 62   Resp 18   Ht 5' 9" (1.753 m)   Wt 84.8 kg (187 lb)   SpO2 97%   BMI 27.62 kg/m²     Physical Exam  Vitals reviewed.   Constitutional:       Appearance: Normal appearance.   HENT:      Head: Normocephalic and atraumatic.      Mouth/Throat:      Pharynx: Oropharynx is clear.   Eyes:      Pupils: Pupils are equal, round, and reactive to light.   Cardiovascular:      Rate and Rhythm: Normal rate and regular rhythm.      Pulses: Normal pulses.      Heart sounds: Normal heart sounds.   Pulmonary:      Breath sounds: Normal breath sounds.   Abdominal:      General: Abdomen is flat.      Palpations: Abdomen is soft.   Musculoskeletal:      Cervical back: Neck supple.   Skin:     General: Skin is warm and dry.   Neurological:      Mental Status: He is alert.       Lab work reviewed.  Assessment:       1. Dementia.  Overall stable.  [place]den trying is high dementia meds.    2. Corrected hypothyroidism    3. Hyperlipidemia.  Good control     4. Hyperglycemia.  Stable     5. History of prostate cancer.  Status post radiation with nerve damage.  Suspect stabbing sensation in the groin is a consequence of that i.e. neuropathic pain.    Plan:     Add Aricept 5 mg daily.  New prescription sent to pharmacy.  Consider addition of gabapentin later.  If he tolerates Aricept after 1 month we can add gabapentin.  They will call.  Or if he does not tolerate the Aricept we can always start the gabapentin later.  At any rate follow-up with me 3 months without lab work            "

## 2024-04-24 ENCOUNTER — TELEPHONE (OUTPATIENT)
Dept: INTERNAL MEDICINE | Facility: CLINIC | Age: 74
End: 2024-04-24
Payer: MEDICARE

## 2024-04-24 NOTE — TELEPHONE ENCOUNTER
----- Message from Kari Oneal LPN sent at 4/24/2024  9:06 AM CDT -----  Regarding: DANIEL Early 5/2/24 @1:00p  Are there any outstanding tasks in the chart? no    Is there any documentation of tasks? no    Please tell patient to bring living will, power or , or advance directive document to visit if they have it.

## 2024-05-01 ENCOUNTER — OFFICE VISIT (OUTPATIENT)
Dept: INTERNAL MEDICINE | Facility: CLINIC | Age: 74
End: 2024-05-01
Payer: MEDICARE

## 2024-05-01 VITALS
DIASTOLIC BLOOD PRESSURE: 84 MMHG | WEIGHT: 181 LBS | SYSTOLIC BLOOD PRESSURE: 132 MMHG | RESPIRATION RATE: 18 BRPM | TEMPERATURE: 97 F | HEART RATE: 63 BPM | OXYGEN SATURATION: 98 % | BODY MASS INDEX: 26.73 KG/M2

## 2024-05-01 DIAGNOSIS — L60.0 INGROWN NAIL OF GREAT TOE OF RIGHT FOOT: Primary | ICD-10-CM

## 2024-05-01 PROCEDURE — 99213 OFFICE O/P EST LOW 20 MIN: CPT | Mod: ,,,

## 2024-05-01 NOTE — ASSESSMENT & PLAN NOTE
Acute on chronic  -no acute infection noted   -referred to Podiatry for intervention  -okay to continue warm water soaks, attempted to loosen skin around male   -okay to utilize OTC Tylenol/ibuprofen for discomfort   -okay to utilize OTC topical lidocaine discomfort  -notify clinic for worsening-development of new symptoms

## 2024-05-01 NOTE — PROGRESS NOTES
Patient ID: Matthew Mejia is a 73 y.o. male.    Chief Complaint: Ingrown Toenail (Ingrown toenail right foot great toe very painful )    Matthew Mejia is a 73 y.o. male, known to Dr Early, is here today for a requested visit.   Today presents with complaints of right great toe discomfort/sensitivity.  Does have prior history of ingrown toenails, typically treats at home with soaking and loosening of surrounding skin.  Has attempted to cut back/file nails to prevent further ingrowing.  Never previously seen by Podiatry.  Today does express some acute concerns since going on a beach trip and has some localized tenderness/discomfort to right great toe/nail tip.  Discomfort worsened with ambulation and direct palpation.  No erythema, edema, or drainage.  Denies fevers, chills.  Requesting intervention to correct prior to trip at end of this week.        MEDICAL HISTORY:    Past Medical History:   Diagnosis Date    BPH (benign prostatic hyperplasia) 1994    Personal history of colonic polyps     Prostate cancer     Shingles     Skin cancer     squamous cell cancer of scalp    Urinary tract infection       Past Surgical History:   Procedure Laterality Date    COLONOSCOPY  06/02/2022    Chilo Walters MD    corpal tunnel  03/2017    EYE SURGERY      PROSTATE BIOPSY      SQUAMOUS CELL CARCINOMA EXCISION  04/11/2017    scalp    TONSILLECTOMY      TRUS   2008    TRUS BX  09/20/2017      Social History     Tobacco Use    Smoking status: Never    Smokeless tobacco: Never   Substance Use Topics    Alcohol use: Yes     Alcohol/week: 4.0 standard drinks of alcohol     Types: 1 Glasses of wine, 3 Cans of beer per week    Drug use: No          Health Maintenance Due   Topic Date Due    Hepatitis C Screening  Never done    TETANUS VACCINE  Never done    Shingles Vaccine (1 of 2) 12/25/2010    RSV Vaccine (Age 60+ and Pregnant patients) (1 - 1-dose 60+ series) Never done    COVID-19 Vaccine (4 - 2023-24  season) 09/01/2023          Patient Care Team:  Yonny Early MD as PCP - General (Internal Medicine)  Chilo Walters MD as Consulting Physician (Colon and Rectal Surgery)      Review of Systems   Constitutional:  Negative for fatigue and fever.   HENT:  Negative for congestion, rhinorrhea, sore throat and trouble swallowing.    Eyes:  Negative for redness and visual disturbance.   Respiratory:  Negative for cough, chest tightness and shortness of breath.    Cardiovascular:  Negative for chest pain and palpitations.   Gastrointestinal:  Negative for abdominal pain, constipation, diarrhea, nausea and vomiting.   Genitourinary:  Negative for dysuria, flank pain, frequency and urgency.   Musculoskeletal:  Negative for arthralgias, gait problem and myalgias.   Skin:  Negative for rash and wound.        Discomfort of toenail   Neurological:  Negative for facial asymmetry, speech difficulty, weakness and headaches.   All other systems reviewed and are negative.      Objective:   /84 (BP Location: Left arm, Patient Position: Sitting, BP Method: Small (Automatic))   Pulse 63   Temp 97.4 °F (36.3 °C) (Temporal)   Resp 18   Wt 82.1 kg (181 lb)   SpO2 98%   BMI 26.73 kg/m²      Physical Exam  Constitutional:       General: He is not in acute distress.     Appearance: Normal appearance.   HENT:      Right Ear: Tympanic membrane, ear canal and external ear normal.      Left Ear: Tympanic membrane, ear canal and external ear normal.      Nose: Nose normal.      Mouth/Throat:      Mouth: Mucous membranes are moist.      Pharynx: Oropharynx is clear.   Eyes:      Extraocular Movements: Extraocular movements intact.      Conjunctiva/sclera: Conjunctivae normal.      Pupils: Pupils are equal, round, and reactive to light.   Cardiovascular:      Rate and Rhythm: Normal rate and regular rhythm.      Pulses: Normal pulses.      Heart sounds: Normal heart sounds. No murmur heard.     No gallop.   Pulmonary:       Effort: Pulmonary effort is normal.      Breath sounds: Normal breath sounds. No wheezing.   Abdominal:      General: Bowel sounds are normal. There is no distension.      Palpations: Abdomen is soft. There is no mass.      Tenderness: There is no abdominal tenderness. There is no guarding.   Musculoskeletal:         General: Normal range of motion.   Feet:      Right foot:      Skin integrity: No ulcer, blister, skin breakdown, erythema or fissure.      Toenail Condition: Right toenails are abnormally thick and ingrown.      Left foot:      Skin integrity: No ulcer, blister, skin breakdown, erythema or fissure.      Toenail Condition: Left toenails are abnormally thick and ingrown.   Skin:     General: Skin is warm and dry.   Neurological:      Mental Status: He is alert. Mental status is at baseline.      Sensory: No sensory deficit.      Motor: No weakness.           Assessment:       ICD-10-CM ICD-9-CM   1. Ingrown nail of great toe of right foot  L60.0 703.0        Plan:     Problem List Items Addressed This Visit          Derm    Ingrown nail of great toe of right foot - Primary (Chronic)     Acute on chronic  -no acute infection noted   -referred to Podiatry for intervention  -okay to continue warm water soaks, attempted to loosen skin around male   -okay to utilize OTC Tylenol/ibuprofen for discomfort   -okay to utilize OTC topical lidocaine discomfort  -notify clinic for worsening-development of new symptoms         Relevant Orders    Ambulatory referral/consult to Podiatry          Follow up for with Dr. Early, Previously scheduled and PRN if need.   -plan specifics discussed above    Orders Placed This Encounter    Ambulatory referral/consult to Podiatry        Medication List with Changes/Refills   Current Medications    ALFUZOSIN (UROXATRAL) 10 MG TB24    Take 10 mg by mouth once daily.    ASPIRIN (ECOTRIN) 81 MG EC TABLET    Take 81 mg by mouth once daily.    AZELASTINE (ASTELIN) 137 MCG (0.1 %)  NASAL SPRAY    1 spray by Nasal route 2 (two) times daily.    CHOLECALCIFEROL, VITAMIN D3, (D3-2000 ORAL)        DONEPEZIL (ARICEPT) 5 MG TABLET    Take 1 tablet (5 mg total) by mouth every evening.    GLUCOSAMINE/CHONDR PADILLA A SOD (GLUCOSAMINE-CHONDROITIN) 1,500-1,200 MG/30 ML LIQD        LEVOTHYROXINE (SYNTHROID) 75 MCG TABLET    Take 1 tablet (75 mcg total) by mouth once daily.    MULTIVIT-MINS/IRON/FOLIC/LYCOP (CENTRUM MEN ORAL)    Daily.    OMEGA-3 FATTY ACIDS/FISH OIL (FISH OIL-OMEGA-3 FATTY ACIDS) 300-1,000 MG CAPSULE    Take by mouth once daily.    UNABLE TO FIND    Focus Factor    UNABLE TO FIND    Prevagen ES

## 2024-05-08 ENCOUNTER — TELEPHONE (OUTPATIENT)
Dept: INTERNAL MEDICINE | Facility: CLINIC | Age: 74
End: 2024-05-08
Payer: MEDICARE

## 2024-05-08 NOTE — TELEPHONE ENCOUNTER
----- Message from Dewayne Lee LPN sent at 5/8/2024  8:33 AM CDT -----  Regarding: regina elizabeth 5/16 @10  Are there any outstanding tasks in chart? No    Is there any documentation of tasks? No    Has the pt seen another physician, been to ER, UCC, or admitted to hospital since last visit?    Has the pt done blood work or imaging since last visit?     5. PLEASE HAVE PATIENT BRING MEDICATION LIST OR BOTTLES TO EVERY OFFICE VISIT

## 2024-05-16 ENCOUNTER — HOSPITAL ENCOUNTER (OUTPATIENT)
Dept: RADIOLOGY | Facility: HOSPITAL | Age: 74
Discharge: HOME | End: 2024-05-16
Attending: INTERNAL MEDICINE
Payer: MEDICARE

## 2024-05-16 ENCOUNTER — PATIENT MESSAGE (OUTPATIENT)
Dept: HEMATOLOGY/ONCOLOGY | Facility: CLINIC | Age: 74
End: 2024-05-16
Payer: MEDICARE

## 2024-05-16 ENCOUNTER — OFFICE VISIT (OUTPATIENT)
Dept: INTERNAL MEDICINE | Facility: CLINIC | Age: 74
End: 2024-05-16
Payer: MEDICARE

## 2024-05-16 VITALS
DIASTOLIC BLOOD PRESSURE: 68 MMHG | SYSTOLIC BLOOD PRESSURE: 122 MMHG | BODY MASS INDEX: 27.11 KG/M2 | OXYGEN SATURATION: 98 % | RESPIRATION RATE: 18 BRPM | WEIGHT: 183 LBS | HEART RATE: 62 BPM | HEIGHT: 69 IN

## 2024-05-16 DIAGNOSIS — M54.42 CHRONIC BILATERAL LOW BACK PAIN WITH BILATERAL SCIATICA: ICD-10-CM

## 2024-05-16 DIAGNOSIS — C61 MALIGNANT NEOPLASM OF PROSTATE: ICD-10-CM

## 2024-05-16 DIAGNOSIS — E03.9 HYPOTHYROIDISM, UNSPECIFIED TYPE: ICD-10-CM

## 2024-05-16 DIAGNOSIS — R73.9 HYPERGLYCEMIA: ICD-10-CM

## 2024-05-16 DIAGNOSIS — E78.5 HYPERLIPIDEMIA, UNSPECIFIED HYPERLIPIDEMIA TYPE: ICD-10-CM

## 2024-05-16 DIAGNOSIS — M54.2 NECK PAIN: ICD-10-CM

## 2024-05-16 DIAGNOSIS — C61 PROSTATE CANCER: ICD-10-CM

## 2024-05-16 DIAGNOSIS — M54.41 CHRONIC BILATERAL LOW BACK PAIN WITH BILATERAL SCIATICA: ICD-10-CM

## 2024-05-16 DIAGNOSIS — Z00.00 WELLNESS EXAMINATION: Primary | ICD-10-CM

## 2024-05-16 DIAGNOSIS — G89.29 CHRONIC BILATERAL LOW BACK PAIN WITH BILATERAL SCIATICA: ICD-10-CM

## 2024-05-16 PROCEDURE — 72040 X-RAY EXAM NECK SPINE 2-3 VW: CPT | Mod: TC

## 2024-05-16 PROCEDURE — G0439 PPPS, SUBSEQ VISIT: HCPCS | Mod: ,,, | Performed by: INTERNAL MEDICINE

## 2024-05-16 PROCEDURE — 99212 OFFICE O/P EST SF 10 MIN: CPT | Mod: 25,,, | Performed by: INTERNAL MEDICINE

## 2024-05-16 PROCEDURE — 72100 X-RAY EXAM L-S SPINE 2/3 VWS: CPT | Mod: TC

## 2024-05-16 RX ORDER — DONEPEZIL HYDROCHLORIDE 10 MG/1
10 TABLET, FILM COATED ORAL NIGHTLY
Qty: 90 TABLET | Refills: 3 | Status: SHIPPED | OUTPATIENT
Start: 2024-05-16 | End: 2025-05-16

## 2024-05-16 RX ORDER — LEVOTHYROXINE SODIUM 75 UG/1
75 TABLET ORAL DAILY
Qty: 90 TABLET | Refills: 3 | Status: SHIPPED | OUTPATIENT
Start: 2024-05-16

## 2024-05-16 RX ORDER — AMITRIPTYLINE HYDROCHLORIDE 10 MG/1
TABLET, FILM COATED ORAL
Qty: 30 TABLET | Refills: 11 | Status: SHIPPED | OUTPATIENT
Start: 2024-05-16

## 2024-05-16 NOTE — PROGRESS NOTES
Subjective:      Patient Care Team:  Yonny Early MD as PCP - General (Internal Medicine)  Chilo Walters MD as Consulting Physician (Colon and Rectal Surgery)      Patient ID: Matthew Mejia is a 73 y.o. male.    Chief Complaint: Medicare AWV      Is a 73-year-old man in for wellness check.  He was also here to follow-up numerous medical problems.  In particular he was to follow-up recent institution of Aricept therapy for cognitive decline.  He tolerates it well.  He was not sure there has been any benefit.  Thinks it was memory is getting worse.  He was asking about potential increase in dose or change in medicines.    Also complaining of some pain in his neck on the left side.  Worse when he 1st gets up in the morning and gets better quickly after he moves around.  Also some pain in the low back that radiates down into the legs.  Relatively mild but he was concerned about it.    The patient did take some of his wife's Elavil.  A 4th of a half of a 10 mg tablet it did help him sleep.      Review of Systems   All other systems reviewed and are negative.       Current Outpatient Medications on File Prior to Visit   Medication Sig Dispense Refill    alfuzosin (UROXATRAL) 10 mg Tb24 Take 10 mg by mouth once daily.      aspirin (ECOTRIN) 81 MG EC tablet Take 81 mg by mouth once daily.      cholecalciferol, vitamin D3, (D3-2000 ORAL)       multivit-mins/iron/folic/lycop (CENTRUM MEN ORAL) Daily.      omega-3 fatty acids/fish oil (FISH OIL-OMEGA-3 FATTY ACIDS) 300-1,000 mg capsule Take by mouth once daily.      [DISCONTINUED] azelastine (ASTELIN) 137 mcg (0.1 %) nasal spray 1 spray by Nasal route 2 (two) times daily. (Patient not taking: Reported on 5/1/2024)      [DISCONTINUED] donepeziL (ARICEPT) 5 MG tablet Take 1 tablet (5 mg total) by mouth every evening. 30 tablet 11    [DISCONTINUED] glucosamine/chondr jennings A sod (GLUCOSAMINE-CHONDROITIN) 1,500-1,200 mg/30 mL Liqd  (Patient not taking:  "Reported on 5/1/2024)      [DISCONTINUED] levothyroxine (SYNTHROID) 75 MCG tablet Take 1 tablet (75 mcg total) by mouth once daily. (Patient taking differently: Take 85 mcg by mouth once daily.) 90 tablet 3     No current facility-administered medications on file prior to visit.       Patient Reported Health Risk Assessment       Opioid Screening: Patient medication list reviewed, patient is not taking prescription opioids. Patient is not using additional opioids than prescribed. Patient is at low risk of substance abuse based on this opioid use history.       Objective:      /68 (BP Location: Right arm, Patient Position: Sitting, BP Method: Small (Manual))   Pulse 62   Resp 18   Ht 5' 9.02" (1.753 m)   Wt 83 kg (183 lb)   SpO2 98%   BMI 27.01 kg/m²     Physical Exam  Vitals reviewed.   Constitutional:       Appearance: Normal appearance.   HENT:      Head: Normocephalic and atraumatic.      Right Ear: Tympanic membrane normal.      Left Ear: Tympanic membrane normal.      Mouth/Throat:      Pharynx: Oropharynx is clear.   Eyes:      Pupils: Pupils are equal, round, and reactive to light.   Neck:      Vascular: No carotid bruit.   Cardiovascular:      Rate and Rhythm: Normal rate and regular rhythm.      Pulses: Normal pulses.      Heart sounds: Normal heart sounds.   Pulmonary:      Effort: Pulmonary effort is normal.      Breath sounds: Normal breath sounds.   Abdominal:      General: Abdomen is flat.      Palpations: Abdomen is soft. There is no mass.      Tenderness: There is no abdominal tenderness. There is no guarding.   Musculoskeletal:         General: No swelling.      Cervical back: Neck supple.   Lymphadenopathy:      Cervical: No cervical adenopathy.   Skin:     General: Skin is warm and dry.   Neurological:      General: No focal deficit present.      Mental Status: He is alert and oriented to person, place, and time.                  No data to display                  5/16/2024    10:00 " AM 5/1/2024     8:20 AM 2/7/2024     2:20 PM 2/5/2024     2:00 PM 11/1/2023     1:20 PM 10/25/2023     2:30 PM 7/24/2023    11:00 AM   Fall Risk Assessment - Outpatient   Mobility Status Ambulatory Ambulatory Ambulatory Ambulatory Ambulatory Ambulatory Ambulatory   Number of falls 0 0 0 0 0 0 0   Identified as fall risk False False False False False False False                Assessment:       1. Wellness     2. Hypothyroidism.  On replacement hormone.  Clinically euthyroid     3. Hyperlipidemia.  At last check lipids were fine     4. History of prostate cancer.  Followed elsewhere     5. Neck pain.  Likely arthritic.  Relatively mild     6. Low back pain.  Similar in severity and likely the same origin.    7. Dementia.  Tolerating low-dose Aricept.    8. Insomnia.  Okay for low-dose Elavil.  Plan:     Increase Aricept to 10 daily.  Check x-rays of the neck and low back.  Okay for p.r.n. Tylenol.    Follow-up with me 3 months with CBC CMP lipid TSH prior.    Add Elavil 5 mg p.o. q.h.s. p.r.n..          Medicare Annual Wellness and Personalized Prevention Plan:   Fall Risk + Home Safety + Hearing Impairment + Depression Screen + Cognitive Impairment Screen + Health Risk Assessment all reviewed.       Health Maintenance Topics with due status: Not Due       Topic Last Completion Date    Colorectal Cancer Screening 06/02/2022    Lipid Panel 02/06/2024    Aspirin/Antiplatelet Therapy 05/01/2024      The patient's Health Maintenance was reviewed and the following appears to be due at this time:   Health Maintenance Due   Topic Date Due    Hepatitis C Screening  Never done    TETANUS VACCINE  Never done    Shingles Vaccine (1 of 2) 12/25/2010    RSV Vaccine (Age 60+ and Pregnant patients) (1 - 1-dose 60+ series) Never done    COVID-19 Vaccine (4 - 2023-24 season) 09/01/2023         Advance Care Planning   I attest to discussing Advance Care Planning with patient and/or family member.  Education was provided including the  importance of the Health Care Power of , Advance Directives, and/or [place] documentation.  The patient expressed understanding to the importance of this information and discussion.  Length of ACP conversation in minutes:          Follow up in about 3 months (around 8/16/2024). In addition to their scheduled follow up, the patient has also been instructed to follow up on as needed basis.

## 2024-05-17 ENCOUNTER — TELEPHONE (OUTPATIENT)
Dept: INTERNAL MEDICINE | Facility: CLINIC | Age: 74
End: 2024-05-17
Payer: MEDICARE

## 2024-05-17 NOTE — TELEPHONE ENCOUNTER
----- Message from Yonny Early MD sent at 5/16/2024  5:03 PM CDT -----  Tell him x-rays of the lumbar spine and cervical spine show changes of arthritis but nothing more.  Recommend p.r.n. Tylenol and consider referral to physical therapy clinic of his choice.  ----- Message -----  From: Interface, Rad Results In  Sent: 5/16/2024   3:37 PM CDT  To: Yonny Early MD

## 2024-05-17 NOTE — TELEPHONE ENCOUNTER
Spoke with pt. Pt voiced understanding and states he is getting better and will call in 1-2 weeks if his pain gets bad again for referral.

## 2024-06-06 NOTE — PROGRESS NOTES
Subjective:       Patient ID: Matthew Mejia is a 73 y.o. male.    Chief Complaint:  My urinary stream is weaker    Diagnosis: Locally advanced, high-grade prostate cancer    Treatment History  XRT + Lupron completed 3/1/18  Zytiga/Prednisone 3/18-12/19 --> Lupron completed 9/19    Current Treatment: Observation    Clinical History:  Patient has a history of BPH with a mildly elevated PSA level for many years. He had previous benign prostate biopsies in 2008. Routine lab work 6/14/17 showed further increase in his PSA level to 7.5 ng/mL with a serum testosterone of 547 ng/dL. He was treated with antibiotics but his PSA level remained elevated. Ultrasound was abnormal. MRI of the prostate 8/18/17 showed a volume of 38 grams with a 2.2 cm aggressive midline peripheral zone lesion at the base and mid gland. There was obliteration of the right rectal prostatic angle at the mid gland consistent with extracapsular disease and encasement of the intra-prostatic seminal vesicle origins. There was no neurovascular bundle involvement. There was no abnormal pelvic adenopathy.    On 9/20/17, he underwent cystoscopy with bilateral retrogrades, bladder biopsies and ultrasound-guided prostate biopsies. Bladder biopsies showed chronic cystitis with cystitis cystica and no evidence of atypia. Prostate biopsies were positive for adenocarcinoma, [place]'s 4+4 = 8 bilaterally with perineural extension on the right. He was evaluated by Dr. Sid Puckett at [facility] for a surgical opinion 10/12/17. Given his extraprostatic extension, primary surgery was not recommended. A bone scan was recommended to complete staging followed by androgen deprivation therapy and EBRT. He was referred to Dr. Martínez for a Radiation Oncology opinion. He underwent a CT PET scan 10/17/17 which showed no abnormal hypermetabolic activity to indicate metastatic disease. Whole-body bone scan 10/19/17 was negative for abnormal osseous uptake. He  was started on Casodex 50 mg daily followed by Lupron prior to starting radiation therapy.    He was seen as a new patient at [facility] 10/30/17 for treatment recommendations. Treatment with Zytiga and prednisone in combination with Lupron was recommended for up to 2 years as adjuvant therapy based on results from the STAMPEDE trial showing significant improvement in failure free survival versus androgen deprivation therapy alone. Follow-up PSA level 8/16/18 was <0.01 ng/mL. He developed radiation proctitis requiring treatment with oral steroids. Colonoscopy 9/26/18 showed minor radiation scarring and no evidence of polyps. His prednisone was gradually tapered back to a maintenance dose of 5 mg daily in combination with his Zytiga. PSA level has remained undetectable.    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 He was evaluated by Ortho and referred for physical therapy with improvement in her symptoms. His Lupron was discontinued 9/26/19.  He completed his intended course of Zytiga and Prednisone 12/19.  PSA level has remained suppressed off of hormonal therapy with slow, very gradual increase.    Interval History   He returns to the office today by himself for a three-month follow-up visit.  He has been having difficulty with short-term memory.  He was started on Aricept by his PCP.  His dose was recently increased to 10 mg daily.  Since that time, he reports more difficulty with his urinary stream being weaker.  He does feel like he is able to empty his bladder completely.  No dysuria or hematuria.  His PSA level has increased from 0.27 to 0.52 ng/mL.  He has no associated symptoms.  His CMP is unremarkable with a normal alkaline phosphatase.  Plain films of the lumbar and cervical spine 5/16/24 ordered by his Internist showed chronic degenerative changes with no findings suspicious for metastatic  disease.        Review of Systems   Constitutional:  Negative for appetite change, fatigue, fever and unexpected weight change.   HENT:  Negative for mouth sores, sore throat and trouble swallowing.    Eyes: Negative.    Respiratory:  Negative for cough and shortness of breath.    Cardiovascular:  Negative for chest pain, palpitations and leg swelling.   Gastrointestinal:  Negative for abdominal distention, abdominal pain, constipation, diarrhea and nausea.   Genitourinary:  Positive for difficulty urinating (Mild) and urgency. Negative for dysuria and frequency.        Decreased urinary stream   Musculoskeletal:  Positive for arthralgias and back pain.   Integumentary:  Negative for pallor and rash.   Neurological:  Negative for dizziness, weakness, numbness and headaches.   Hematological:  Negative for adenopathy. Does not bruise/bleed easily.   Psychiatric/Behavioral: Negative.          Poor short-term memory       PMHx:  BPH, squamous cell carcinoma of the scalp, shingles  PSHx: Prostate biopsy, carpal tunnel release, tonsillectomy, eye surgery, skin cancer scalp  SH:  Lifetime nonsmoker, + social alcohol use. Retired from the technology industry. Lives in [place] with his wife.  FH:  His father had colon cancer, mother had breast cancer. His paternal grandfather had stomach cancer.       Objective:     Vitals:    06/17/24 1115   BP: (!) 147/60   Pulse: 61   Resp: 18   Temp: 98.7 °F (37.1 °C)       Physical Exam  Constitutional:       Appearance: Normal appearance.      Comments: Elderly, well-developed white male in NAD.   HENT:      Head: Normocephalic.      Mouth/Throat:      Mouth: Mucous membranes are moist.      Pharynx: Oropharynx is clear. No posterior oropharyngeal erythema.   Eyes:      Extraocular Movements: Extraocular movements intact.      Conjunctiva/sclera: Conjunctivae normal.      Pupils: Pupils are equal, round, and reactive to light.   Cardiovascular:      Rate and Rhythm: Normal rate and  regular rhythm.      Heart sounds: No murmur heard.  Pulmonary:      Comments: Lungs clear to auscultation.  Abdominal:      General: Bowel sounds are normal. There is no distension.      Palpations: Abdomen is soft. There is no mass.      Tenderness: There is no abdominal tenderness.   Musculoskeletal:         General: No swelling or tenderness. Normal range of motion.      Cervical back: Neck supple. No tenderness.   Skin:     General: Skin is warm and dry.      Findings: No rash.   Neurological:      Mental Status: He is alert and oriented to person, place, and time.      Cranial Nerves: No cranial nerve deficit.      Motor: No weakness.   Psychiatric:         Mood and Affect: Mood normal.         Behavior: Behavior normal.       ECOG SCORE    0 - Fully active-able to carry on all pre-disease performance without restriction             LABORATORY  Recent Results (from the past 336 hour(s))   Comprehensive Metabolic Panel    Collection Time: 06/12/24 11:36 AM   Result Value Ref Range    Sodium 143 136 - 145 mmol/L    Potassium 4.4 3.5 - 5.1 mmol/L    Chloride 108 (H) 98 - 107 mmol/L    CO2 26 23 - 31 mmol/L    Glucose 97 82 - 115 mg/dL    Blood Urea Nitrogen 15.7 8.4 - 25.7 mg/dL    Creatinine 1.02 0.73 - 1.18 mg/dL    Calcium 9.5 8.8 - 10.0 mg/dL    Protein Total 6.6 5.8 - 7.6 gm/dL    Albumin 4.2 3.4 - 4.8 g/dL    Globulin 2.4 2.4 - 3.5 gm/dL    Albumin/Globulin Ratio 1.8 1.1 - 2.0 ratio    Bilirubin Total 0.6 <=1.5 mg/dL    ALP 66 40 - 150 unit/L    ALT 37 0 - 55 unit/L    AST 33 5 - 34 unit/L    eGFR >60 mL/min/1.73/m2    Anion Gap 9.0 mEq/L    BUN/Creatinine Ratio 15    PSA, Total (Diagnostic)    Collection Time: 06/12/24 11:36 AM   Result Value Ref Range    Prostate Specific Antigen 0.52 <=4.00 ng/mL                  10/11/22   1/10/23   4/17/23   7/20/23   10/20/23  1/29/24   6/12/24  PSA     0.28         0.35        0.31        0.23        0.27        0.27        0.52       Assessment:   Locally advanced  high-grade prostate cancer - clinical T3 with extraprostatic extension and no evidence of metastatic disease  Chronic neuropathic pain involving perineum and scrotum  Poor short term memory    Plan:   PSA level shows mild interval increase.  No clinical symptoms or laboratory abnormalities to suggest symptomatic, metastatic disease.  Continued close observation is recommended.  RTC in 3 months with a follow-up PSA level.  He has an appointment with his urologist tomorrow to discuss his urinary symptoms.? Related to Aricept.      EDEN SANTIAGO MD    Other Physicians  Dr. Eden Harkins

## 2024-06-12 ENCOUNTER — LAB VISIT (OUTPATIENT)
Dept: LAB | Facility: HOSPITAL | Age: 74
End: 2024-06-12
Attending: NURSE PRACTITIONER
Payer: MEDICARE

## 2024-06-12 DIAGNOSIS — C61 MALIGNANT NEOPLASM OF PROSTATE: ICD-10-CM

## 2024-06-12 LAB
ALBUMIN SERPL-MCNC: 4.2 G/DL (ref 3.4–4.8)
ALBUMIN/GLOB SERPL: 1.8 RATIO (ref 1.1–2)
ALP SERPL-CCNC: 66 UNIT/L (ref 40–150)
ALT SERPL-CCNC: 37 UNIT/L (ref 0–55)
ANION GAP SERPL CALC-SCNC: 9 MEQ/L
AST SERPL-CCNC: 33 UNIT/L (ref 5–34)
BILIRUB SERPL-MCNC: 0.6 MG/DL
BUN SERPL-MCNC: 15.7 MG/DL (ref 8.4–25.7)
CALCIUM SERPL-MCNC: 9.5 MG/DL (ref 8.8–10)
CHLORIDE SERPL-SCNC: 108 MMOL/L (ref 98–107)
CO2 SERPL-SCNC: 26 MMOL/L (ref 23–31)
CREAT SERPL-MCNC: 1.02 MG/DL (ref 0.73–1.18)
CREAT/UREA NIT SERPL: 15
GFR SERPLBLD CREATININE-BSD FMLA CKD-EPI: >60 ML/MIN/1.73/M2
GLOBULIN SER-MCNC: 2.4 GM/DL (ref 2.4–3.5)
GLUCOSE SERPL-MCNC: 97 MG/DL (ref 82–115)
POTASSIUM SERPL-SCNC: 4.4 MMOL/L (ref 3.5–5.1)
PROT SERPL-MCNC: 6.6 GM/DL (ref 5.8–7.6)
PSA SERPL-MCNC: 0.52 NG/ML
SODIUM SERPL-SCNC: 143 MMOL/L (ref 136–145)

## 2024-06-12 PROCEDURE — 36415 COLL VENOUS BLD VENIPUNCTURE: CPT

## 2024-06-12 PROCEDURE — 84153 ASSAY OF PSA TOTAL: CPT

## 2024-06-12 PROCEDURE — 80053 COMPREHEN METABOLIC PANEL: CPT

## 2024-06-17 ENCOUNTER — OFFICE VISIT (OUTPATIENT)
Dept: HEMATOLOGY/ONCOLOGY | Facility: CLINIC | Age: 74
End: 2024-06-17
Payer: MEDICARE

## 2024-06-17 VITALS
DIASTOLIC BLOOD PRESSURE: 60 MMHG | BODY MASS INDEX: 27.6 KG/M2 | RESPIRATION RATE: 18 BRPM | HEART RATE: 61 BPM | TEMPERATURE: 99 F | HEIGHT: 69 IN | SYSTOLIC BLOOD PRESSURE: 147 MMHG | WEIGHT: 186.38 LBS

## 2024-06-17 DIAGNOSIS — C61 MALIGNANT NEOPLASM OF PROSTATE: Primary | ICD-10-CM

## 2024-06-17 PROCEDURE — 99999 PR PBB SHADOW E&M-EST. PATIENT-LVL IV: CPT | Mod: PBBFAC,,, | Performed by: INTERNAL MEDICINE

## 2024-06-17 PROCEDURE — 99214 OFFICE O/P EST MOD 30 MIN: CPT | Mod: PBBFAC | Performed by: INTERNAL MEDICINE

## 2024-06-17 PROCEDURE — 99214 OFFICE O/P EST MOD 30 MIN: CPT | Mod: S$PBB,,, | Performed by: INTERNAL MEDICINE

## 2024-06-17 RX ORDER — GLUCOSAMINE/CHONDRO SU A 500-400 MG
1 TABLET ORAL DAILY
COMMUNITY

## 2024-08-07 ENCOUNTER — TELEPHONE (OUTPATIENT)
Dept: INTERNAL MEDICINE | Facility: CLINIC | Age: 74
End: 2024-08-07
Payer: MEDICARE

## 2024-08-14 ENCOUNTER — LAB VISIT (OUTPATIENT)
Dept: LAB | Facility: HOSPITAL | Age: 74
End: 2024-08-14
Attending: INTERNAL MEDICINE
Payer: MEDICARE

## 2024-08-14 DIAGNOSIS — C61 PROSTATE CANCER: ICD-10-CM

## 2024-08-14 DIAGNOSIS — M54.41 CHRONIC BILATERAL LOW BACK PAIN WITH BILATERAL SCIATICA: ICD-10-CM

## 2024-08-14 DIAGNOSIS — M54.42 CHRONIC BILATERAL LOW BACK PAIN WITH BILATERAL SCIATICA: ICD-10-CM

## 2024-08-14 DIAGNOSIS — C61 MALIGNANT NEOPLASM OF PROSTATE: ICD-10-CM

## 2024-08-14 DIAGNOSIS — E03.9 HYPOTHYROIDISM, UNSPECIFIED TYPE: ICD-10-CM

## 2024-08-14 DIAGNOSIS — R73.9 HYPERGLYCEMIA: ICD-10-CM

## 2024-08-14 DIAGNOSIS — M54.2 NECK PAIN: ICD-10-CM

## 2024-08-14 DIAGNOSIS — G89.29 CHRONIC BILATERAL LOW BACK PAIN WITH BILATERAL SCIATICA: ICD-10-CM

## 2024-08-14 DIAGNOSIS — E78.5 HYPERLIPIDEMIA, UNSPECIFIED HYPERLIPIDEMIA TYPE: ICD-10-CM

## 2024-08-14 LAB
ALBUMIN SERPL-MCNC: 4 G/DL (ref 3.4–4.8)
ALBUMIN/GLOB SERPL: 1.7 RATIO (ref 1.1–2)
ALP SERPL-CCNC: 70 UNIT/L (ref 40–150)
ALT SERPL-CCNC: 26 UNIT/L (ref 0–55)
ANION GAP SERPL CALC-SCNC: 8 MEQ/L
AST SERPL-CCNC: 24 UNIT/L (ref 5–34)
BASOPHILS # BLD AUTO: 0.02 X10(3)/MCL
BASOPHILS NFR BLD AUTO: 0.5 %
BILIRUB SERPL-MCNC: 0.6 MG/DL
BUN SERPL-MCNC: 13.9 MG/DL (ref 8.4–25.7)
CALCIUM SERPL-MCNC: 9.5 MG/DL (ref 8.8–10)
CHLORIDE SERPL-SCNC: 111 MMOL/L (ref 98–107)
CHOLEST SERPL-MCNC: 219 MG/DL
CHOLEST/HDLC SERPL: 3 {RATIO} (ref 0–5)
CO2 SERPL-SCNC: 25 MMOL/L (ref 23–31)
CREAT SERPL-MCNC: 0.99 MG/DL (ref 0.73–1.18)
CREAT/UREA NIT SERPL: 14
EOSINOPHIL # BLD AUTO: 0.05 X10(3)/MCL (ref 0–0.9)
EOSINOPHIL NFR BLD AUTO: 1.3 %
ERYTHROCYTE [DISTWIDTH] IN BLOOD BY AUTOMATED COUNT: 13.9 % (ref 11.5–17)
GFR SERPLBLD CREATININE-BSD FMLA CKD-EPI: >60 ML/MIN/1.73/M2
GLOBULIN SER-MCNC: 2.3 GM/DL (ref 2.4–3.5)
GLUCOSE SERPL-MCNC: 99 MG/DL (ref 82–115)
HCT VFR BLD AUTO: 39.1 % (ref 42–52)
HDLC SERPL-MCNC: 63 MG/DL (ref 35–60)
HGB BLD-MCNC: 13.1 G/DL (ref 14–18)
IMM GRANULOCYTES # BLD AUTO: 0.01 X10(3)/MCL (ref 0–0.04)
IMM GRANULOCYTES NFR BLD AUTO: 0.3 %
LDLC SERPL CALC-MCNC: 134 MG/DL (ref 50–140)
LYMPHOCYTES # BLD AUTO: 1.04 X10(3)/MCL (ref 0.6–4.6)
LYMPHOCYTES NFR BLD AUTO: 28 %
MCH RBC QN AUTO: 32.1 PG (ref 27–31)
MCHC RBC AUTO-ENTMCNC: 33.5 G/DL (ref 33–36)
MCV RBC AUTO: 95.8 FL (ref 80–94)
MONOCYTES # BLD AUTO: 0.63 X10(3)/MCL (ref 0.1–1.3)
MONOCYTES NFR BLD AUTO: 16.9 %
NEUTROPHILS # BLD AUTO: 1.97 X10(3)/MCL (ref 2.1–9.2)
NEUTROPHILS NFR BLD AUTO: 53 %
NRBC BLD AUTO-RTO: 0 %
PLATELET # BLD AUTO: 223 X10(3)/MCL (ref 130–400)
PLATELETS.RETICULATED NFR BLD AUTO: 7.3 % (ref 0.9–11.2)
PMV BLD AUTO: 11.1 FL (ref 7.4–10.4)
POTASSIUM SERPL-SCNC: 4.6 MMOL/L (ref 3.5–5.1)
PROT SERPL-MCNC: 6.3 GM/DL (ref 5.8–7.6)
RBC # BLD AUTO: 4.08 X10(6)/MCL (ref 4.7–6.1)
SODIUM SERPL-SCNC: 144 MMOL/L (ref 136–145)
TRIGL SERPL-MCNC: 109 MG/DL (ref 34–140)
TSH SERPL-ACNC: 0.64 UIU/ML (ref 0.35–4.94)
VLDLC SERPL CALC-MCNC: 22 MG/DL
WBC # BLD AUTO: 3.72 X10(3)/MCL (ref 4.5–11.5)

## 2024-08-14 PROCEDURE — 85025 COMPLETE CBC W/AUTO DIFF WBC: CPT

## 2024-08-14 PROCEDURE — 84443 ASSAY THYROID STIM HORMONE: CPT

## 2024-08-14 PROCEDURE — 80061 LIPID PANEL: CPT

## 2024-08-14 PROCEDURE — 36415 COLL VENOUS BLD VENIPUNCTURE: CPT

## 2024-08-14 PROCEDURE — 80053 COMPREHEN METABOLIC PANEL: CPT

## 2024-08-16 ENCOUNTER — OFFICE VISIT (OUTPATIENT)
Dept: INTERNAL MEDICINE | Facility: CLINIC | Age: 74
End: 2024-08-16
Payer: MEDICARE

## 2024-08-16 VITALS
HEIGHT: 69 IN | WEIGHT: 186.63 LBS | RESPIRATION RATE: 18 BRPM | OXYGEN SATURATION: 98 % | HEART RATE: 63 BPM | SYSTOLIC BLOOD PRESSURE: 136 MMHG | BODY MASS INDEX: 27.64 KG/M2 | DIASTOLIC BLOOD PRESSURE: 72 MMHG

## 2024-08-16 DIAGNOSIS — E03.9 HYPOTHYROIDISM, UNSPECIFIED TYPE: Primary | ICD-10-CM

## 2024-08-16 DIAGNOSIS — E78.5 HYPERLIPIDEMIA, UNSPECIFIED HYPERLIPIDEMIA TYPE: ICD-10-CM

## 2024-08-16 DIAGNOSIS — R73.9 HYPERGLYCEMIA: ICD-10-CM

## 2024-08-16 DIAGNOSIS — J34.89 RHINORRHEA: ICD-10-CM

## 2024-08-16 DIAGNOSIS — C61 MALIGNANT NEOPLASM OF PROSTATE: ICD-10-CM

## 2024-08-16 DIAGNOSIS — R41.3 MEMORY LOSS: ICD-10-CM

## 2024-08-16 DIAGNOSIS — Z12.5 ENCOUNTER FOR SCREENING FOR MALIGNANT NEOPLASM OF PROSTATE: ICD-10-CM

## 2024-08-16 RX ORDER — FLUTICASONE PROPIONATE 50 MCG
1 SPRAY, SUSPENSION (ML) NASAL DAILY
Qty: 15 ML | Refills: 11 | Status: SHIPPED | OUTPATIENT
Start: 2024-08-16

## 2024-08-16 NOTE — PROGRESS NOTES
Patient ID: [id number]      Subjective:     Chief Complaint: Follow-up (Pt states side effects since he started taking the donepezil, he has printed out the side effects to discuss with you)      Matthew Mejia is a 73 y.o. male.  Is a 73-year-old man in for follow-up of numerous problems.  He brought in a list of various issues.  Mostly chronic problems.  Memory problems persist but seemed to be better with the Aricept.  He was taking it at night but seemed to do better when he took it in the morning.  I recommended he stick with that protocol.  Having a few dreams is a side effect but not necessarily bad dreams.    Still with some arthritic problems, especially his back.      Also some issues with prostatism.  He was followed by Dr. Makayla DUEÑAS but thinks he may switch over to another urologist.  That would be fine with me.  He was seen both Dr. Razo inspfelipa in the past and I think either 1 would be great.    Follow-up        Review of Systems    Outpatient Medications Marked as Taking for the 8/16/24 encounter (Office Visit) with Yonny Early MD   Medication Sig Dispense Refill    alfuzosin (UROXATRAL) 10 mg Tb24 Take 10 mg by mouth once daily.      amitriptyline (ELAVIL) 10 MG tablet One half tab q hs prn insomnia 30 tablet 11    aspirin (ECOTRIN) 81 MG EC tablet Take 81 mg by mouth once daily.      cholecalciferol, vitamin D3, (D3-2000 ORAL)       donepeziL (ARICEPT) 10 MG tablet Take 1 tablet (10 mg total) by mouth every evening. 90 tablet 3    glucosamine-chondroitin 500-400 mg tablet Take 1 tablet by mouth Daily.      levothyroxine (SYNTHROID) 75 MCG tablet Take 1 tablet (75 mcg total) by mouth once daily. 90 tablet 3    multivit-mins/iron/folic/lycop (CENTRUM MEN ORAL) Daily.      omega-3 fatty acids/fish oil (FISH OIL-OMEGA-3 FATTY ACIDS) 300-1,000 mg capsule Take by mouth once daily.         Objective:     /72 (BP Location: Right arm, Patient Position: Sitting, BP Method: Large (Manual))   Pulse  "63   Resp 18   Ht 5' 9" (1.753 m)   Wt 84.6 kg (186 lb 9.6 oz)   SpO2 98%   BMI 27.56 kg/m²     Physical Exam  Vitals reviewed.   Constitutional:       Appearance: Normal appearance.   HENT:      Head: Normocephalic and atraumatic.      Right Ear: Tympanic membrane normal.      Left Ear: Tympanic membrane normal.      Mouth/Throat:      Pharynx: Oropharynx is clear.   Eyes:      Pupils: Pupils are equal, round, and reactive to light.   Neck:      Vascular: No carotid bruit.   Cardiovascular:      Rate and Rhythm: Normal rate and regular rhythm.      Pulses: Normal pulses.      Heart sounds: Normal heart sounds.   Pulmonary:      Effort: Pulmonary effort is normal.      Breath sounds: Normal breath sounds.   Abdominal:      General: Abdomen is flat.      Palpations: Abdomen is soft. There is no mass.      Tenderness: There is no abdominal tenderness. There is no guarding.   Musculoskeletal:         General: No swelling.      Cervical back: Neck supple.   Lymphadenopathy:      Cervical: No cervical adenopathy.   Skin:     General: Skin is warm and dry.   Neurological:      General: No focal deficit present.      Mental Status: He is alert and oriented to person, place, and time.     Lab work reviewed    Assessment:     1. Memory loss.  Overall stable.  Some benefit from Aricept.  Okay to take it in the morning     2. Hyperlipidemia.  Acceptable control     3. Hyperglycemia.  Most recent testing normal     4. Complaints of clear rhinorrhea.  Okay to continue Flonase.  He tells me Astelin did not work very well in the past.    5. History of prostate CA.  Followed elsewhere    6. Degenerative joint disease, especially back      7. Hypothyroidism.  Current dose is perfect    Plan:   Continue same meds TLC etc..  Follow-up with me 6 months annual checkup with CBC CMP lipid TSH PSA prior  Problem List Items Addressed This Visit          Oncology    Malignant neoplasm of prostate    Relevant Orders    CBC Auto " Differential    Comprehensive Metabolic Panel    Lipid Panel    TSH    Urinalysis    PSA, Screening       Endocrine    Hypothyroidism - Primary    Relevant Orders    CBC Auto Differential    Comprehensive Metabolic Panel    Lipid Panel    TSH    Urinalysis    PSA, Screening     Other Visit Diagnoses       Hyperlipidemia, unspecified hyperlipidemia type        Relevant Orders    CBC Auto Differential    Comprehensive Metabolic Panel    Lipid Panel    TSH    Urinalysis    PSA, Screening    Hyperglycemia        Relevant Orders    CBC Auto Differential    Comprehensive Metabolic Panel    Lipid Panel    TSH    Urinalysis    PSA, Screening    Memory loss        Relevant Orders    CBC Auto Differential    Comprehensive Metabolic Panel    Lipid Panel    TSH    Urinalysis    PSA, Screening    Rhinorrhea        Relevant Orders    CBC Auto Differential    Comprehensive Metabolic Panel    Lipid Panel    TSH    Urinalysis    PSA, Screening    Encounter for screening for malignant neoplasm of prostate        Relevant Orders    PSA, Screening             Orders Placed This Encounter   Procedures    CBC Auto Differential     Standing Status:   Future     Standing Expiration Date:   8/16/2025    Comprehensive Metabolic Panel     Standing Status:   Future     Standing Expiration Date:   8/16/2025    Lipid Panel     Standing Status:   Future     Standing Expiration Date:   8/16/2025    TSH     Standing Status:   Future     Standing Expiration Date:   8/16/2025    Urinalysis     Standing Status:   Future     Number of Occurrences:   1     Standing Expiration Date:   8/16/2025    PSA, Screening     Standing Status:   Future     Standing Expiration Date:   8/16/2025        Medication List with Changes/Refills   New Medications    FLUTICASONE PROPIONATE (FLONASE) 50 MCG/ACTUATION NASAL SPRAY    1 spray (50 mcg total) by Each Nostril route once daily.       Start Date: 8/16/2024 End Date: --   Current Medications    ALFUZOSIN (UROXATRAL)  10 MG TB24    Take 10 mg by mouth once daily.       Start Date: 4/9/2022  End Date: --    AMITRIPTYLINE (ELAVIL) 10 MG TABLET    One half tab q hs prn insomnia       Start Date: 5/16/2024 End Date: --    ASPIRIN (ECOTRIN) 81 MG EC TABLET    Take 81 mg by mouth once daily.       Start Date: --        End Date: --    CHOLECALCIFEROL, VITAMIN D3, (D3-2000 ORAL)           Start Date: 5/25/2023 End Date: --    DONEPEZIL (ARICEPT) 10 MG TABLET    Take 1 tablet (10 mg total) by mouth every evening.       Start Date: 5/16/2024 End Date: 5/16/2025    GLUCOSAMINE-CHONDROITIN 500-400 MG TABLET    Take 1 tablet by mouth Daily.       Start Date: --        End Date: --    LEVOTHYROXINE (SYNTHROID) 75 MCG TABLET    Take 1 tablet (75 mcg total) by mouth once daily.       Start Date: 5/16/2024 End Date: --    MULTIVIT-MINS/IRON/FOLIC/LYCOP (CENTRUM MEN ORAL)    Daily.       Start Date: 1/15/2024 End Date: --    OMEGA-3 FATTY ACIDS/FISH OIL (FISH OIL-OMEGA-3 FATTY ACIDS) 300-1,000 MG CAPSULE    Take by mouth once daily.       Start Date: --        End Date: --            Follow up in about 6 months (around 2/16/2025). In addition to their scheduled follow up, the patient has also been instructed to follow up on as needed basis.       Yonny Early

## 2024-08-20 ENCOUNTER — TELEPHONE (OUTPATIENT)
Dept: INTERNAL MEDICINE | Facility: CLINIC | Age: 74
End: 2024-08-20
Payer: MEDICARE

## 2024-08-20 NOTE — TELEPHONE ENCOUNTER
----- Message from Meagan Lloyd sent at 8/20/2024  8:53 AM CDT -----  Who Called: Matthew Mejia    Preferred Method of Contact: Phone Call    Patient's Preferred Phone Number on File: [phone]     Best Call Back Number, if different:    Additional Information: Matthew is [facility] [phone].  Dr. Yen.

## 2024-10-02 NOTE — PROGRESS NOTES
"Subjective:       Patient ID: Matthew Mejia is a 74 y.o. male.    Chief Complaint:  "I am more forgetful"    Diagnosis: Locally advanced, high-grade prostate cancer    Treatment History  XRT + Lupron completed 3/1/18  Zytiga/Prednisone 3/18-12/19 --> Lupron completed 9/19    Current Treatment: Observation    Clinical History:  Patient has a history of BPH with a mildly elevated PSA level for many years. He had previous benign prostate biopsies in 2008. Routine lab work 6/14/17 showed further increase in his PSA level to 7.5 ng/mL with a serum testosterone of 547 ng/dL. He was treated with antibiotics but his PSA level remained elevated. Ultrasound was abnormal. MRI of the prostate 8/18/17 showed a volume of 38 grams with a 2.2 cm aggressive midline peripheral zone lesion at the base and mid gland. There was obliteration of the right rectal prostatic angle at the mid gland consistent with extracapsular disease and encasement of the intra-prostatic seminal vesicle origins. There was no neurovascular bundle involvement. There was no abnormal pelvic adenopathy.    On 9/20/17, he underwent cystoscopy with bilateral retrogrades, bladder biopsies and ultrasound-guided prostate biopsies. Bladder biopsies showed chronic cystitis with cystitis cystica and no evidence of atypia. Prostate biopsies were positive for adenocarcinoma, [place] 4+4 = 8 bilaterally with perineural extension on the right. He was evaluated by Dr. Sid Puckett at [facility] for a surgical opinion 10/12/17. Given his extraprostatic extension, primary surgery was not recommended. A bone scan was recommended to complete staging followed by androgen deprivation therapy and EBRT. He was referred to Dr. Martínez for a Radiation Oncology opinion. He underwent a CT PET scan 10/17/17 which showed no abnormal hypermetabolic activity to indicate metastatic disease. Whole-body bone scan 10/19/17 was negative for abnormal osseous uptake. He was " started on Casodex 50 mg daily followed by Lupron prior to starting radiation therapy.    He was seen as a new patient [facility] 10/30/17 for treatment recommendations. Treatment with Zytiga and prednisone in combination with Lupron was recommended for up to 2 years as adjuvant therapy based on results from the STAMPEDE trial showing significant improvement in failure free survival versus androgen deprivation therapy alone. Follow-up PSA level 8/16/18 was <0.01 ng/mL. He developed radiation proctitis requiring treatment with oral steroids. Colonoscopy 9/26/18 showed minor radiation scarring and no evidence of polyps. His prednisone was gradually tapered back to a maintenance dose of 5 mg daily in combination with his Zytiga. PSA level has remained undetectable.    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 He was evaluated by Ortho and referred for physical therapy with improvement in her symptoms. His Lupron was discontinued 9/26/19.  He completed his intended course of Zytiga and Prednisone 12/19.  PSA level has remained suppressed off of hormonal therapy with slow, very gradual increase.    Interval History   He returns to the office today by himself for a three-month follow-up visit.  He is ambulatory without assistance.  Overall, he feels well and tries to remain as active as possible.  He completed all treatment for his locally advanced prostate cancer just over 4 years ago.  PSA has shown very gradual progression over the last year.  He notes a weaker urinary stream if he consumes alcohol, so he rarely does.  He remains on Aricept for his memory.  He feels his short term memory issues are currently stable.      Review of Systems   Constitutional:  Negative for appetite change, fatigue, fever and unexpected weight change.   HENT:  Negative for mouth sores, sore throat and trouble swallowing.    Eyes:  Negative.  Negative for visual disturbance.   Respiratory:  Negative for cough and shortness of breath.    Cardiovascular:  Negative for chest pain, palpitations and leg swelling.   Gastrointestinal:  Negative for abdominal distention, abdominal pain, constipation, diarrhea and nausea.   Genitourinary:  Positive for difficulty urinating (Mild) and urgency. Negative for dysuria and frequency.        Decreased urinary stream   Musculoskeletal:  Positive for arthralgias and back pain.   Integumentary:  Negative for pallor and rash.   Neurological:  Negative for dizziness, weakness, numbness and headaches.   Hematological:  Negative for adenopathy. Does not bruise/bleed easily.   Psychiatric/Behavioral: Negative.  The patient is not nervous/anxious.         Poor short-term memory       PMHx:  BPH, squamous cell carcinoma of the scalp, shingles  PSHx: Prostate biopsy, carpal tunnel release, tonsillectomy, eye surgery, skin cancer scalp  SH:  Lifetime nonsmoker, + social alcohol use. Retired from the technology industry. Lives in [place] with his wife.  FH:  His father had colon cancer, mother had breast cancer. His paternal grandfather had stomach cancer.       Objective:     Vitals:    10/03/24 1450   BP: 130/72   Pulse: (!) 56   Resp: 15   Temp: 97.9 °F (36.6 °C)         Physical Exam  Constitutional:       Appearance: Normal appearance.      Comments: Elderly, well-developed white male in NAD.   HENT:      Head: Normocephalic.      Mouth/Throat:      Mouth: Mucous membranes are moist.      Pharynx: Oropharynx is clear. No posterior oropharyngeal erythema.   Eyes:      Extraocular Movements: Extraocular movements intact.      Conjunctiva/sclera: Conjunctivae normal.      Pupils: Pupils are equal, round, and reactive to light.   Cardiovascular:      Rate and Rhythm: Normal rate and regular rhythm.      Heart sounds: No murmur heard.  Pulmonary:      Comments: Lungs clear to auscultation.  Abdominal:      General:  Bowel sounds are normal. There is no distension.      Palpations: Abdomen is soft. There is no mass.      Tenderness: There is no abdominal tenderness.   Musculoskeletal:         General: No swelling or tenderness. Normal range of motion.      Cervical back: Neck supple. No tenderness.   Skin:     General: Skin is warm and dry.      Findings: No rash.   Neurological:      Mental Status: He is alert and oriented to person, place, and time.      Cranial Nerves: No cranial nerve deficit.      Motor: No weakness.   Psychiatric:         Mood and Affect: Mood normal.         Behavior: Behavior normal.      Comments: Mildly anxious       ECOG SCORE    1 - Restricted in strenuous activity-ambulatory and able to carry out work of a light nature             LABORATORY  Recent Results (from the past 2 weeks)   CBC with Differential    Collection Time: 10/03/24  2:45 PM   Result Value Ref Range    WBC 5.67 4.50 - 11.50 x10(3)/mcL    RBC 3.88 (L) 4.70 - 6.10 x10(6)/mcL    Hgb 12.4 (L) 14.0 - 18.0 g/dL    Hct 37.1 (L) 42.0 - 52.0 %    MCV 95.6 (H) 80.0 - 94.0 fL    MCH 32.0 (H) 27.0 - 31.0 pg    MCHC 33.4 33.0 - 36.0 g/dL    RDW 13.2 11.5 - 17.0 %    Platelet 218 130 - 400 x10(3)/mcL    MPV 10.9 (H) 7.4 - 10.4 fL    Neut % 68.1 %    Lymph % 17.6 %    Mono % 12.7 %    Eos % 1.1 %    Basophil % 0.5 %    Lymph # 1.00 0.6 - 4.6 x10(3)/mcL    Neut # 3.86 2.1 - 9.2 x10(3)/mcL    Mono # 0.72 0.1 - 1.3 x10(3)/mcL    Eos # 0.06 0 - 0.9 x10(3)/mcL    Baso # 0.03 <=0.2 x10(3)/mcL    IG# 0.00 0 - 0.04 x10(3)/mcL    IG% 0.0 %                    10/11/22   1/10/23   4/17/23   7/20/23   10/20/23  1/29/24   6/12/24  PSA     0.28         0.35        0.31        0.23        0.27        0.27        0.52       Assessment:   Locally advanced high-grade prostate cancer - clinical T3 with extraprostatic extension and no evidence of metastatic disease  Chronic neuropathic pain involving perineum and scrotum  Poor short term memory    Plan:   PSA level  remains low despite previous mild increase.  He has no associated symptoms.  Repeat level drawn today is pending.  I will contact him with the results once available.  Plan for follow-up in three months with CBC, CMP, PSA.  All questions answered to the satisfaction of the patient.    PARKER MORILLO, FNP-C  [facility] [place]     Other Physicians  Dr. Yonny Harkins

## 2024-10-03 ENCOUNTER — LAB VISIT (OUTPATIENT)
Dept: LAB | Facility: HOSPITAL | Age: 74
End: 2024-10-03
Attending: INTERNAL MEDICINE
Payer: MEDICARE

## 2024-10-03 ENCOUNTER — OFFICE VISIT (OUTPATIENT)
Dept: HEMATOLOGY/ONCOLOGY | Facility: CLINIC | Age: 74
End: 2024-10-03
Payer: MEDICARE

## 2024-10-03 VITALS
RESPIRATION RATE: 15 BRPM | HEIGHT: 69 IN | DIASTOLIC BLOOD PRESSURE: 72 MMHG | HEART RATE: 56 BPM | TEMPERATURE: 98 F | SYSTOLIC BLOOD PRESSURE: 130 MMHG | WEIGHT: 184 LBS | BODY MASS INDEX: 27.25 KG/M2 | OXYGEN SATURATION: 98 %

## 2024-10-03 DIAGNOSIS — C61 MALIGNANT NEOPLASM OF PROSTATE: Primary | ICD-10-CM

## 2024-10-03 DIAGNOSIS — C61 MALIGNANT NEOPLASM OF PROSTATE: ICD-10-CM

## 2024-10-03 LAB
ALBUMIN SERPL-MCNC: 4.1 G/DL (ref 3.4–4.8)
ALBUMIN/GLOB SERPL: 2 RATIO (ref 1.1–2)
ALP SERPL-CCNC: 77 UNIT/L (ref 40–150)
ALT SERPL-CCNC: 26 UNIT/L (ref 0–55)
ANION GAP SERPL CALC-SCNC: 5 MEQ/L
AST SERPL-CCNC: 26 UNIT/L (ref 5–34)
BASOPHILS # BLD AUTO: 0.03 X10(3)/MCL
BASOPHILS NFR BLD AUTO: 0.5 %
BILIRUB SERPL-MCNC: 0.4 MG/DL
BUN SERPL-MCNC: 13.9 MG/DL (ref 8.4–25.7)
CALCIUM SERPL-MCNC: 9.3 MG/DL (ref 8.8–10)
CHLORIDE SERPL-SCNC: 110 MMOL/L (ref 98–107)
CO2 SERPL-SCNC: 27 MMOL/L (ref 23–31)
CREAT SERPL-MCNC: 1.02 MG/DL (ref 0.73–1.18)
CREAT/UREA NIT SERPL: 14
EOSINOPHIL # BLD AUTO: 0.06 X10(3)/MCL (ref 0–0.9)
EOSINOPHIL NFR BLD AUTO: 1.1 %
ERYTHROCYTE [DISTWIDTH] IN BLOOD BY AUTOMATED COUNT: 13.2 % (ref 11.5–17)
GFR SERPLBLD CREATININE-BSD FMLA CKD-EPI: >60 ML/MIN/1.73/M2
GLOBULIN SER-MCNC: 2.1 GM/DL (ref 2.4–3.5)
GLUCOSE SERPL-MCNC: 95 MG/DL (ref 82–115)
HCT VFR BLD AUTO: 37.1 % (ref 42–52)
HGB BLD-MCNC: 12.4 G/DL (ref 14–18)
IMM GRANULOCYTES # BLD AUTO: 0 X10(3)/MCL (ref 0–0.04)
IMM GRANULOCYTES NFR BLD AUTO: 0 %
LYMPHOCYTES # BLD AUTO: 1 X10(3)/MCL (ref 0.6–4.6)
LYMPHOCYTES NFR BLD AUTO: 17.6 %
MCH RBC QN AUTO: 32 PG (ref 27–31)
MCHC RBC AUTO-ENTMCNC: 33.4 G/DL (ref 33–36)
MCV RBC AUTO: 95.6 FL (ref 80–94)
MONOCYTES # BLD AUTO: 0.72 X10(3)/MCL (ref 0.1–1.3)
MONOCYTES NFR BLD AUTO: 12.7 %
NEUTROPHILS # BLD AUTO: 3.86 X10(3)/MCL (ref 2.1–9.2)
NEUTROPHILS NFR BLD AUTO: 68.1 %
PLATELET # BLD AUTO: 218 X10(3)/MCL (ref 130–400)
PMV BLD AUTO: 10.9 FL (ref 7.4–10.4)
POTASSIUM SERPL-SCNC: 4.3 MMOL/L (ref 3.5–5.1)
PROT SERPL-MCNC: 6.2 GM/DL (ref 5.8–7.6)
PSA SERPL-MCNC: 0.28 NG/ML
RBC # BLD AUTO: 3.88 X10(6)/MCL (ref 4.7–6.1)
SODIUM SERPL-SCNC: 142 MMOL/L (ref 136–145)
WBC # BLD AUTO: 5.67 X10(3)/MCL (ref 4.5–11.5)

## 2024-10-03 PROCEDURE — 36415 COLL VENOUS BLD VENIPUNCTURE: CPT

## 2024-10-03 PROCEDURE — 85025 COMPLETE CBC W/AUTO DIFF WBC: CPT

## 2024-10-03 PROCEDURE — 99999 PR PBB SHADOW E&M-EST. PATIENT-LVL IV: CPT | Mod: PBBFAC,,, | Performed by: NURSE PRACTITIONER

## 2024-10-03 PROCEDURE — 80053 COMPREHEN METABOLIC PANEL: CPT

## 2024-10-03 PROCEDURE — 99213 OFFICE O/P EST LOW 20 MIN: CPT | Mod: S$PBB,,, | Performed by: NURSE PRACTITIONER

## 2024-10-03 PROCEDURE — 84153 ASSAY OF PSA TOTAL: CPT

## 2024-10-03 PROCEDURE — 99214 OFFICE O/P EST MOD 30 MIN: CPT | Mod: PBBFAC | Performed by: NURSE PRACTITIONER

## 2024-10-03 RX ORDER — TADALAFIL 5 MG/1
5 TABLET ORAL
COMMUNITY
Start: 2024-09-17

## 2024-10-03 RX ORDER — TAMSULOSIN HYDROCHLORIDE 0.4 MG/1
CAPSULE ORAL
COMMUNITY

## 2025-01-10 ENCOUNTER — LAB VISIT (OUTPATIENT)
Dept: LAB | Facility: HOSPITAL | Age: 75
End: 2025-01-10
Attending: NURSE PRACTITIONER
Payer: MEDICARE

## 2025-01-10 ENCOUNTER — OFFICE VISIT (OUTPATIENT)
Dept: HEMATOLOGY/ONCOLOGY | Facility: CLINIC | Age: 75
End: 2025-01-10
Payer: MEDICARE

## 2025-01-10 VITALS
SYSTOLIC BLOOD PRESSURE: 137 MMHG | BODY MASS INDEX: 27.35 KG/M2 | DIASTOLIC BLOOD PRESSURE: 73 MMHG | TEMPERATURE: 99 F | HEIGHT: 69 IN | HEART RATE: 74 BPM | WEIGHT: 184.63 LBS | RESPIRATION RATE: 20 BRPM

## 2025-01-10 DIAGNOSIS — C61 MALIGNANT NEOPLASM OF PROSTATE: ICD-10-CM

## 2025-01-10 DIAGNOSIS — R31.9 HEMATURIA, UNSPECIFIED TYPE: Primary | ICD-10-CM

## 2025-01-10 DIAGNOSIS — R31.9 HEMATURIA, UNSPECIFIED TYPE: ICD-10-CM

## 2025-01-10 LAB
ALBUMIN SERPL-MCNC: 4.1 G/DL (ref 3.4–4.8)
ALBUMIN/GLOB SERPL: 1.5 RATIO (ref 1.1–2)
ALP SERPL-CCNC: 75 UNIT/L (ref 40–150)
ALT SERPL-CCNC: 26 UNIT/L (ref 0–55)
ANION GAP SERPL CALC-SCNC: 7 MEQ/L
AST SERPL-CCNC: 28 UNIT/L (ref 5–34)
BACTERIA #/AREA URNS AUTO: ABNORMAL /HPF
BASOPHILS # BLD AUTO: 0.02 X10(3)/MCL
BASOPHILS NFR BLD AUTO: 0.5 %
BILIRUB SERPL-MCNC: 0.7 MG/DL
BILIRUB UR QL STRIP.AUTO: NEGATIVE
BUN SERPL-MCNC: 15.5 MG/DL (ref 8.4–25.7)
CALCIUM SERPL-MCNC: 9.3 MG/DL (ref 8.8–10)
CHLORIDE SERPL-SCNC: 107 MMOL/L (ref 98–107)
CLARITY UR: CLEAR
CO2 SERPL-SCNC: 26 MMOL/L (ref 23–31)
COLOR UR AUTO: YELLOW
CREAT SERPL-MCNC: 1.07 MG/DL (ref 0.72–1.25)
CREAT/UREA NIT SERPL: 14
EOSINOPHIL # BLD AUTO: 0.02 X10(3)/MCL (ref 0–0.9)
EOSINOPHIL NFR BLD AUTO: 0.5 %
ERYTHROCYTE [DISTWIDTH] IN BLOOD BY AUTOMATED COUNT: 13.4 % (ref 11.5–17)
GFR SERPLBLD CREATININE-BSD FMLA CKD-EPI: >60 ML/MIN/1.73/M2
GLOBULIN SER-MCNC: 2.7 GM/DL (ref 2.4–3.5)
GLUCOSE SERPL-MCNC: 149 MG/DL (ref 82–115)
GLUCOSE UR QL STRIP: NORMAL
HCT VFR BLD AUTO: 42.7 % (ref 42–52)
HGB BLD-MCNC: 13.9 G/DL (ref 14–18)
HGB UR QL STRIP: NEGATIVE
IMM GRANULOCYTES # BLD AUTO: 0.01 X10(3)/MCL (ref 0–0.04)
IMM GRANULOCYTES NFR BLD AUTO: 0.3 %
KETONES UR QL STRIP: NEGATIVE
LEUKOCYTE ESTERASE UR QL STRIP: NEGATIVE
LYMPHOCYTES # BLD AUTO: 1.02 X10(3)/MCL (ref 0.6–4.6)
LYMPHOCYTES NFR BLD AUTO: 25.8 %
MCH RBC QN AUTO: 31.2 PG (ref 27–31)
MCHC RBC AUTO-ENTMCNC: 32.6 G/DL (ref 33–36)
MCV RBC AUTO: 96 FL (ref 80–94)
MONOCYTES # BLD AUTO: 0.45 X10(3)/MCL (ref 0.1–1.3)
MONOCYTES NFR BLD AUTO: 11.4 %
MUCOUS THREADS URNS QL MICRO: ABNORMAL /LPF
NEUTROPHILS # BLD AUTO: 2.43 X10(3)/MCL (ref 2.1–9.2)
NEUTROPHILS NFR BLD AUTO: 61.5 %
NITRITE UR QL STRIP: NEGATIVE
PH UR STRIP: 5 [PH]
PLATELET # BLD AUTO: 237 X10(3)/MCL (ref 130–400)
PMV BLD AUTO: 10.4 FL (ref 7.4–10.4)
POTASSIUM SERPL-SCNC: 4 MMOL/L (ref 3.5–5.1)
PROT SERPL-MCNC: 6.8 GM/DL (ref 5.8–7.6)
PROT UR QL STRIP: NEGATIVE
PSA SERPL-MCNC: 0.36 NG/ML
RBC # BLD AUTO: 4.45 X10(6)/MCL (ref 4.7–6.1)
RBC #/AREA URNS AUTO: ABNORMAL /HPF
SODIUM SERPL-SCNC: 140 MMOL/L (ref 136–145)
SP GR UR STRIP.AUTO: 1.02 (ref 1–1.03)
SQUAMOUS #/AREA URNS LPF: ABNORMAL /HPF
UROBILINOGEN UR STRIP-ACNC: NORMAL
WBC # BLD AUTO: 3.95 X10(3)/MCL (ref 4.5–11.5)
WBC #/AREA URNS AUTO: ABNORMAL /HPF

## 2025-01-10 PROCEDURE — 99999 PR PBB SHADOW E&M-EST. PATIENT-LVL V: CPT | Mod: PBBFAC,,, | Performed by: NURSE PRACTITIONER

## 2025-01-10 PROCEDURE — 99215 OFFICE O/P EST HI 40 MIN: CPT | Mod: PBBFAC | Performed by: NURSE PRACTITIONER

## 2025-01-10 PROCEDURE — 81001 URINALYSIS AUTO W/SCOPE: CPT

## 2025-01-10 PROCEDURE — 36415 COLL VENOUS BLD VENIPUNCTURE: CPT

## 2025-01-10 PROCEDURE — 80053 COMPREHEN METABOLIC PANEL: CPT

## 2025-01-10 PROCEDURE — 85025 COMPLETE CBC W/AUTO DIFF WBC: CPT

## 2025-01-10 PROCEDURE — 84153 ASSAY OF PSA TOTAL: CPT

## 2025-01-10 RX ORDER — AZELASTINE 1 MG/ML
1 SPRAY, METERED NASAL 2 TIMES DAILY
COMMUNITY

## 2025-01-10 NOTE — PATIENT INSTRUCTIONS
Follow-up with Dr. Early regarding sinus/ear symptoms.  Continue Astelin nasal spray twice daily.  Nasa Mist OTC nasal saline spray as needed.    Urinalysis today.  Refer to new Urologist due to correction of Dr. Yen  I will call with results of urine and PSA.

## 2025-01-10 NOTE — PROGRESS NOTES
"Subjective:       Patient ID: Matthew Mejia is a 74 y.o. male.    Chief Complaint:  "I noticed blood in my urine"    Diagnosis: Locally advanced, high-grade prostate cancer    Treatment History  XRT + Lupron completed 3/1/18  Zytiga/Prednisone 3/18-12/19 --> Lupron completed 9/19    Current Treatment: Observation    Clinical History:  Patient has a history of BPH with a mildly elevated PSA level for many years. He had previous benign prostate biopsies in 2008. Routine lab work 6/14/17 showed further increase in his PSA level to 7.5 ng/mL with a serum testosterone of 547 ng/dL. He was treated with antibiotics but his PSA level remained elevated. Ultrasound was abnormal. MRI of the prostate 8/18/17 showed a volume of 38 grams with a 2.2 cm aggressive midline peripheral zone lesion at the base and mid gland. There was obliteration of the right rectal prostatic angle at the mid gland consistent with extracapsular disease and encasement of the intra-prostatic seminal vesicle origins. There was no neurovascular bundle involvement. There was no abnormal pelvic adenopathy.    On 9/20/17, he underwent cystoscopy with bilateral retrogrades, bladder biopsies and ultrasound-guided prostate biopsies. Bladder biopsies showed chronic cystitis with cystitis cystica and no evidence of atypia. Prostate biopsies were positive for adenocarcinoma, [place]'s 4+4 = 8 bilaterally with perineural extension on the right. He was evaluated by Dr. Sid Puckett at [facility] for a surgical opinion 10/12/17. Given his extraprostatic extension, primary surgery was not recommended. A bone scan was recommended to complete staging followed by androgen deprivation therapy and EBRT. He was referred to Dr. Martínez for a Radiation Oncology opinion. He underwent a CT PET scan 10/17/17 which showed no abnormal hypermetabolic activity to indicate metastatic disease. Whole-body bone scan 10/19/17 was negative for abnormal osseous uptake. He " was started on Casodex 50 mg daily followed by Lupron prior to starting radiation therapy.    He was seen as a new patient at [facility] 10/30/17 for treatment recommendations. Treatment with Zytiga and prednisone in combination with Lupron was recommended for up to 2 years as adjuvant therapy based on results from the STAMPEDE trial showing significant improvement in failure free survival versus androgen deprivation therapy alone. Follow-up PSA level 8/16/18 was <0.01 ng/mL. He developed radiation proctitis requiring treatment with oral steroids. Colonoscopy 9/26/18 showed minor radiation scarring and no evidence of polyps. His prednisone was gradually tapered back to a maintenance dose of 5 mg daily in combination with his Zytiga. PSA level has remained undetectable.    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 He was evaluated by Ortho and referred for physical therapy with improvement in her symptoms. His Lupron was discontinued 9/26/19.  He completed his intended course of Zytiga and Prednisone 12/19.  PSA level has remained suppressed off of hormonal therapy with slow, very gradual increase.    Interval History   He returns to the office today by himself for a three-month follow-up visit.  He is ambulatory without assistance.  He noticed some hematuria/bloody discharge in his undergarments in the last few weeks.  He was evaluated by his Urologist.  UA showed no evidence of infection.  He was told his Urologist was retiring and he would have further workup with his replacement.  His symptoms continue.  He has no fever.  He does have stable perineal pain that is neuropathic in nature.  He also has poor short term memory.   Laboratory drawn today shows stable mild anemia and leukopenia.  CMP is normal and PSA shows minimal increase.  UA shows no microscopic hematuria or evidence of infection.     Review of  Systems   Constitutional:  Negative for appetite change, fatigue, fever and unexpected weight change.   HENT:  Negative for mouth sores, sore throat and trouble swallowing.    Eyes: Negative.  Negative for visual disturbance.   Respiratory:  Negative for cough and shortness of breath.    Cardiovascular:  Negative for chest pain, palpitations and leg swelling.   Gastrointestinal:  Negative for abdominal distention, abdominal pain, constipation, diarrhea and nausea.   Genitourinary:  Positive for difficulty urinating (Mild), hematuria and urgency. Negative for dysuria and frequency.        Decreased urinary stream  Chronic perineal pain, neuropathic   Musculoskeletal:  Positive for arthralgias and back pain.   Integumentary:  Negative for pallor and rash.   Neurological:  Negative for dizziness, weakness, numbness and headaches.   Hematological:  Negative for adenopathy. Does not bruise/bleed easily.   Psychiatric/Behavioral: Negative.  The patient is not nervous/anxious.         Poor short-term memory       PMHx:  BPH, squamous cell carcinoma of the scalp, shingles  PSHx: Prostate biopsy, carpal tunnel release, tonsillectomy, eye surgery, skin cancer scalp  SH:  Lifetime nonsmoker, + social alcohol use. 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  FH:  His father had colon cancer, mother had breast cancer. His paternal grandfather had stomach cancer.       Objective:     Vitals:    01/10/25 0952   BP: 137/73   Pulse: 74   Resp: 20   Temp: 98.5 °F (36.9 °C)         Physical Exam  Constitutional:       Appearance: Normal appearance.      Comments: Elderly, well-developed white male in NAD.   HENT:      Head: Normocephalic.      Mouth/Throat:      Mouth: Mucous membranes are moist.      Pharynx: Oropharynx is clear. No posterior oropharyngeal erythema.   Eyes:      Extraocular Movements: Extraocular movements intact.      Conjunctiva/sclera: Conjunctivae normal.      Pupils: Pupils are equal,  round, and reactive to light.   Cardiovascular:      Rate and Rhythm: Normal rate and regular rhythm.      Heart sounds: No murmur heard.  Pulmonary:      Comments: Lungs clear to auscultation.  Abdominal:      General: Bowel sounds are normal. There is no distension.      Palpations: Abdomen is soft. There is no mass.      Tenderness: There is no abdominal tenderness.   Musculoskeletal:         General: No swelling or tenderness. Normal range of motion.      Cervical back: Neck supple. No tenderness.   Skin:     General: Skin is warm and dry.      Findings: No rash.   Neurological:      Mental Status: He is alert and oriented to person, place, and time.      Cranial Nerves: No cranial nerve deficit.      Motor: No weakness.   Psychiatric:         Mood and Affect: Mood normal.         Behavior: Behavior normal.      Comments: Mildly anxious       ECOG SCORE                 LABORATORY  Recent Results (from the past 2 weeks)   Comprehensive Metabolic Panel    Collection Time: 01/10/25  9:39 AM   Result Value Ref Range    Sodium 140 136 - 145 mmol/L    Potassium 4.0 3.5 - 5.1 mmol/L    Chloride 107 98 - 107 mmol/L    CO2 26 23 - 31 mmol/L    Glucose 149 (H) 82 - 115 mg/dL    Blood Urea Nitrogen 15.5 8.4 - 25.7 mg/dL    Creatinine 1.07 0.72 - 1.25 mg/dL    Calcium 9.3 8.8 - 10.0 mg/dL    Protein Total 6.8 5.8 - 7.6 gm/dL    Albumin 4.1 3.4 - 4.8 g/dL    Globulin 2.7 2.4 - 3.5 gm/dL    Albumin/Globulin Ratio 1.5 1.1 - 2.0 ratio    Bilirubin Total 0.7 <=1.5 mg/dL    ALP 75 40 - 150 unit/L    ALT 26 0 - 55 unit/L    AST 28 5 - 34 unit/L    eGFR >60 mL/min/1.73/m2    Anion Gap 7.0 mEq/L    BUN/Creatinine Ratio 14    PSA    Collection Time: 01/10/25  9:39 AM   Result Value Ref Range    Prostate Specific Antigen 0.36 <=4.00 ng/mL   CBC with Differential    Collection Time: 01/10/25  9:39 AM   Result Value Ref Range    WBC 3.95 (L) 4.50 - 11.50 x10(3)/mcL    RBC 4.45 (L) 4.70 - 6.10 x10(6)/mcL    Hgb 13.9 (L) 14.0 - 18.0 g/dL     Hct 42.7 42.0 - 52.0 %    MCV 96.0 (H) 80.0 - 94.0 fL    MCH 31.2 (H) 27.0 - 31.0 pg    MCHC 32.6 (L) 33.0 - 36.0 g/dL    RDW 13.4 11.5 - 17.0 %    Platelet 237 130 - 400 x10(3)/mcL    MPV 10.4 7.4 - 10.4 fL    Neut % 61.5 %    Lymph % 25.8 %    Mono % 11.4 %    Eos % 0.5 %    Basophil % 0.5 %    Imm Grans % 0.3 %    Neut # 2.43 2.1 - 9.2 x10(3)/mcL    Lymph # 1.02 0.6 - 4.6 x10(3)/mcL    Mono # 0.45 0.1 - 1.3 x10(3)/mcL    Eos # 0.02 0 - 0.9 x10(3)/mcL    Baso # 0.02 <=0.2 x10(3)/mcL    Imm Gran # 0.01 0.00 - 0.04 x10(3)/mcL   Urinalysis, Reflex to Urine Culture    Collection Time: 01/10/25 10:48 AM    Specimen: Urine   Result Value Ref Range    Color, UA Yellow Yellow, Light-Yellow, Colorless, Straw, Dark-Yellow    Appearance, UA Clear Clear    Specific Gravity, UA 1.021 1.005 - 1.030    pH, UA 5.0 5.0 - 8.5    Protein, UA Negative Negative    Glucose, UA Normal Negative, Normal    Ketones, UA Negative Negative    Blood, UA Negative Negative    Bilirubin, UA Negative Negative    Urobilinogen, UA Normal 0.2, 1.0, Normal    Nitrites, UA Negative Negative    Leukocyte Esterase, UA Negative Negative    RBC, UA 0-5 None Seen, 0-2, 3-5, 0-5 /HPF    WBC, UA 0-5 None Seen, 0-2, 3-5, 0-5 /HPF    Bacteria, UA None Seen None Seen, Trace /HPF    Squamous Epithelial Cells, UA None Seen None Seen, Trace, Rare /HPF    Mucous, UA Trace (A) None Seen /LPF                    10/11/22   1/10/23   4/17/23   7/20/23   10/20/23  1/29/24   6/12/24  10/3/24  01/10/2025  PSA     0.28         0.35        0.31        0.23        0.27        0.27        0.52        0.28       0.36       Assessment:   Locally advanced high-grade prostate cancer - clinical T3 with extraprostatic extension and no evidence of metastatic disease  Chronic neuropathic pain involving perineum and scrotum  Poor short term memory  Subjective hematuria    Plan:   PSA level remains low and relatively stable with no obvious signs of disease progression.  He will be  referred to Urology for evaluation of subjective hematuria.    RTC 3-4 months with CBC, CMP, and PSA.  All questions answered.    PARKER MORILLO, FNP-C  [facility] [place]     Other Physicians  Dr. Yonny Harkins

## 2025-02-10 ENCOUNTER — TELEPHONE (OUTPATIENT)
Dept: INTERNAL MEDICINE | Facility: CLINIC | Age: 75
End: 2025-02-10
Payer: MEDICARE

## 2025-02-10 NOTE — TELEPHONE ENCOUNTER
----- Message from Nurse Debbie sent at 2/10/2025  8:05 AM CST -----  Regarding: regina Cisneros 02/17 @9:00  Fasting labs needed.

## 2025-02-12 ENCOUNTER — LAB VISIT (OUTPATIENT)
Dept: LAB | Facility: HOSPITAL | Age: 75
End: 2025-02-12
Attending: INTERNAL MEDICINE
Payer: MEDICARE

## 2025-02-12 DIAGNOSIS — C61 MALIGNANT NEOPLASM OF PROSTATE: ICD-10-CM

## 2025-02-12 DIAGNOSIS — R73.9 HYPERGLYCEMIA: ICD-10-CM

## 2025-02-12 DIAGNOSIS — E03.9 HYPOTHYROIDISM, UNSPECIFIED TYPE: ICD-10-CM

## 2025-02-12 DIAGNOSIS — J34.89 RHINORRHEA: ICD-10-CM

## 2025-02-12 DIAGNOSIS — E78.5 HYPERLIPIDEMIA, UNSPECIFIED HYPERLIPIDEMIA TYPE: ICD-10-CM

## 2025-02-12 DIAGNOSIS — R41.3 MEMORY LOSS: ICD-10-CM

## 2025-02-12 DIAGNOSIS — Z12.5 ENCOUNTER FOR SCREENING FOR MALIGNANT NEOPLASM OF PROSTATE: ICD-10-CM

## 2025-02-12 LAB
ALBUMIN SERPL-MCNC: 3.8 G/DL (ref 3.4–4.8)
ALBUMIN/GLOB SERPL: 1.7 RATIO (ref 1.1–2)
ALP SERPL-CCNC: 74 UNIT/L (ref 40–150)
ALT SERPL-CCNC: 24 UNIT/L (ref 0–55)
ANION GAP SERPL CALC-SCNC: 2 MEQ/L
AST SERPL-CCNC: 23 UNIT/L (ref 5–34)
BASOPHILS # BLD AUTO: 0.03 X10(3)/MCL
BASOPHILS NFR BLD AUTO: 0.9 %
BILIRUB SERPL-MCNC: 0.5 MG/DL
BUN SERPL-MCNC: 14.1 MG/DL (ref 8.4–25.7)
CALCIUM SERPL-MCNC: 8.9 MG/DL (ref 8.8–10)
CHLORIDE SERPL-SCNC: 111 MMOL/L (ref 98–107)
CHOLEST SERPL-MCNC: 206 MG/DL
CHOLEST/HDLC SERPL: 3 {RATIO} (ref 0–5)
CO2 SERPL-SCNC: 28 MMOL/L (ref 23–31)
CREAT SERPL-MCNC: 0.9 MG/DL (ref 0.72–1.25)
CREAT/UREA NIT SERPL: 16
EOSINOPHIL # BLD AUTO: 0.03 X10(3)/MCL (ref 0–0.9)
EOSINOPHIL NFR BLD AUTO: 0.9 %
ERYTHROCYTE [DISTWIDTH] IN BLOOD BY AUTOMATED COUNT: 13.8 % (ref 11.5–17)
GFR SERPLBLD CREATININE-BSD FMLA CKD-EPI: >60 ML/MIN/1.73/M2
GLOBULIN SER-MCNC: 2.3 GM/DL (ref 2.4–3.5)
GLUCOSE SERPL-MCNC: 92 MG/DL (ref 82–115)
HCT VFR BLD AUTO: 38.9 % (ref 42–52)
HDLC SERPL-MCNC: 61 MG/DL (ref 35–60)
HGB BLD-MCNC: 13 G/DL (ref 14–18)
IMM GRANULOCYTES # BLD AUTO: 0 X10(3)/MCL (ref 0–0.04)
IMM GRANULOCYTES NFR BLD AUTO: 0 %
LDLC SERPL CALC-MCNC: 127 MG/DL (ref 50–140)
LYMPHOCYTES # BLD AUTO: 0.97 X10(3)/MCL (ref 0.6–4.6)
LYMPHOCYTES NFR BLD AUTO: 27.9 %
MCH RBC QN AUTO: 31.1 PG (ref 27–31)
MCHC RBC AUTO-ENTMCNC: 33.4 G/DL (ref 33–36)
MCV RBC AUTO: 93.1 FL (ref 80–94)
MONOCYTES # BLD AUTO: 0.51 X10(3)/MCL (ref 0.1–1.3)
MONOCYTES NFR BLD AUTO: 14.7 %
NEUTROPHILS # BLD AUTO: 1.94 X10(3)/MCL (ref 2.1–9.2)
NEUTROPHILS NFR BLD AUTO: 55.6 %
NRBC BLD AUTO-RTO: 0 %
PLATELET # BLD AUTO: 213 X10(3)/MCL (ref 130–400)
PMV BLD AUTO: 11.1 FL (ref 7.4–10.4)
POTASSIUM SERPL-SCNC: 4.4 MMOL/L (ref 3.5–5.1)
PROT SERPL-MCNC: 6.1 GM/DL (ref 5.8–7.6)
PSA SERPL-MCNC: 0.31 NG/ML
RBC # BLD AUTO: 4.18 X10(6)/MCL (ref 4.7–6.1)
SODIUM SERPL-SCNC: 141 MMOL/L (ref 136–145)
TRIGL SERPL-MCNC: 90 MG/DL (ref 34–140)
TSH SERPL-ACNC: 1.35 UIU/ML (ref 0.35–4.94)
VLDLC SERPL CALC-MCNC: 18 MG/DL
WBC # BLD AUTO: 3.48 X10(3)/MCL (ref 4.5–11.5)

## 2025-02-12 PROCEDURE — 85025 COMPLETE CBC W/AUTO DIFF WBC: CPT

## 2025-02-12 PROCEDURE — 84153 ASSAY OF PSA TOTAL: CPT

## 2025-02-12 PROCEDURE — 80053 COMPREHEN METABOLIC PANEL: CPT

## 2025-02-12 PROCEDURE — 84443 ASSAY THYROID STIM HORMONE: CPT

## 2025-02-12 PROCEDURE — 80061 LIPID PANEL: CPT

## 2025-02-12 PROCEDURE — 36415 COLL VENOUS BLD VENIPUNCTURE: CPT

## 2025-02-17 ENCOUNTER — OFFICE VISIT (OUTPATIENT)
Dept: INTERNAL MEDICINE | Facility: CLINIC | Age: 75
End: 2025-02-17
Payer: MEDICARE

## 2025-02-17 VITALS
DIASTOLIC BLOOD PRESSURE: 75 MMHG | BODY MASS INDEX: 27.58 KG/M2 | WEIGHT: 186.19 LBS | OXYGEN SATURATION: 98 % | HEIGHT: 69 IN | RESPIRATION RATE: 18 BRPM | SYSTOLIC BLOOD PRESSURE: 121 MMHG | HEART RATE: 67 BPM

## 2025-02-17 DIAGNOSIS — R41.3 MEMORY LOSS: ICD-10-CM

## 2025-02-17 DIAGNOSIS — E78.5 HYPERLIPIDEMIA, UNSPECIFIED HYPERLIPIDEMIA TYPE: ICD-10-CM

## 2025-02-17 DIAGNOSIS — H93.11 TINNITUS AURIUM, RIGHT: ICD-10-CM

## 2025-02-17 DIAGNOSIS — C61 MALIGNANT NEOPLASM OF PROSTATE: ICD-10-CM

## 2025-02-17 DIAGNOSIS — E03.9 HYPOTHYROIDISM, UNSPECIFIED TYPE: Primary | ICD-10-CM

## 2025-02-17 DIAGNOSIS — I67.2 CEREBRAL ATHEROSCLEROSIS: ICD-10-CM

## 2025-02-17 DIAGNOSIS — M25.551 RIGHT HIP PAIN: ICD-10-CM

## 2025-02-17 DIAGNOSIS — R73.9 HYPERGLYCEMIA: ICD-10-CM

## 2025-02-17 DIAGNOSIS — I77.9 CAROTID ARTERY DISEASE, UNSPECIFIED LATERALITY, UNSPECIFIED TYPE: ICD-10-CM

## 2025-02-17 RX ORDER — ASPIRIN 81 MG/1
81 TABLET ORAL DAILY
COMMUNITY

## 2025-02-17 NOTE — PROGRESS NOTES
Patient ID: [id number]      Subjective:     Chief Complaint: No chief complaint on file.      Matthew Mejia is a 74 y.o. male.  The patient is in for six-month follow-up of multiple problems but also has some new issues.      The 1st is tinnitus of the right ear.  Also some switching signs when he hears his pulse.  He does have a remote history of carotid stenosis although most recent ultrasound showed no significant blockage.  This has about 2 years ago.      Also complaining of some pain in the right hip.  Better with Tylenol.    Also feels like his memory is getting a bit worse.    He does have an appointment with a new urologist later this month.        Review of Systems    Outpatient Medications Marked as Taking for the 2/17/25 encounter (Office Visit) with Yonny Early MD   Medication Sig Dispense Refill    alfuzosin (UROXATRAL) 10 mg Tb24 Take 10 mg by mouth once daily.      amitriptyline (ELAVIL) 10 MG tablet One half tab q hs prn insomnia 30 tablet 11    aspirin (ECOTRIN) 81 MG EC tablet Take 81 mg by mouth once daily.      azelastine (ASTELIN) 137 mcg (0.1 %) nasal spray 1 spray by Nasal route 2 (two) times daily.      cholecalciferol, vitamin D3, (D3-2000 ORAL)       donepeziL (ARICEPT) 10 MG tablet Take 1 tablet (10 mg total) by mouth every evening. 90 tablet 3    fluticasone propionate (FLONASE) 50 mcg/actuation nasal spray 1 spray (50 mcg total) by Each Nostril route once daily. (Patient taking differently: 1 spray by Each Nostril route as needed.) 15 mL 11    glucosamine-chondroitin 500-400 mg tablet Take 1 tablet by mouth Daily.      levothyroxine (SYNTHROID) 75 MCG tablet Take 1 tablet (75 mcg total) by mouth once daily. 90 tablet 3    multivit-mins/iron/folic/lycop (CENTRUM MEN ORAL) Daily.      omega-3 fatty acids/fish oil (FISH OIL-OMEGA-3 FATTY ACIDS) 300-1,000 mg capsule Take by mouth once daily.      tadalafiL (CIALIS) 5 MG tablet Take 5 mg by mouth.         Objective:     /75  "(BP Location: Left arm, Patient Position: Sitting)   Pulse 67   Resp 18   Ht 5' 9" (1.753 m)   Wt 84.5 kg (186 lb 3.2 oz)   SpO2 98%   BMI 27.50 kg/m²     Physical Exam  Vitals reviewed.   Constitutional:       Appearance: Normal appearance.   HENT:      Head: Normocephalic and atraumatic.      Ears:      Comments: Tympanic membranes appear unremarkable.     Mouth/Throat:      Pharynx: Oropharynx is clear.   Eyes:      Pupils: Pupils are equal, round, and reactive to light.   Neck:      Comments: No carotid bruits appreciated  Cardiovascular:      Rate and Rhythm: Normal rate and regular rhythm.      Pulses: Normal pulses.      Heart sounds: Normal heart sounds.   Pulmonary:      Breath sounds: Normal breath sounds.   Abdominal:      General: Abdomen is flat.      Palpations: Abdomen is soft.   Musculoskeletal:      Cervical back: Neck supple.   Skin:     General: Skin is warm and dry.   Neurological:      Mental Status: He is alert.       Lab work reviewed  Assessment:     1. Corrected hypothyroidism.  Clinically and chemically euthyroid    2. Hyperlipidemia.  Improved    3. Hyperglycemia.  Currently not an issue     4. History of prostate cancer.  Followed by Urology    5. Right hip pain.  Likely degenerative joint disease, responds to Tylenol     6. Tinnitus right ear.  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 Question of carotid stenosis.  He can hear his heart sounds on the right.  A few years ago the carotid ultrasound was read as right stenosis more more recent studies were read as normal.    8. Memory loss.  Stable      Plan:   Check carotid ultrasound.  Refer to ENT.  Meds stay the same.  If he does have significant stenosis of the carotid arteries and will have to escalate treatment of cholesterol.  Otherwise follow-up with me 6 months with CBC CMP lipid TSH prior  Problem List Items Addressed This Visit          Oncology    Malignant neoplasm of prostate    Relevant Orders    CBC Auto " Differential    Comprehensive Metabolic Panel    Lipid Panel    TSH       Endocrine    Hypothyroidism - Primary    Relevant Orders    CBC Auto Differential    Comprehensive Metabolic Panel    Lipid Panel    TSH     Other Visit Diagnoses         Hyperlipidemia, unspecified hyperlipidemia type        Relevant Orders    CBC Auto Differential    Comprehensive Metabolic Panel    Lipid Panel    TSH      Hyperglycemia        Relevant Orders    CBC Auto Differential    Comprehensive Metabolic Panel    Lipid Panel    TSH      Memory loss        Relevant Orders    CBC Auto Differential    Comprehensive Metabolic Panel    Lipid Panel    TSH      Tinnitus aurium, right        Relevant Orders    Ambulatory referral/consult to ENT      Right hip pain          Carotid artery disease, unspecified laterality, unspecified type        Relevant Orders    US Carotid Bilateral      Cerebral atherosclerosis        Relevant Orders    US Carotid Bilateral    CBC Auto Differential    Comprehensive Metabolic Panel    Lipid Panel    TSH             Orders Placed This Encounter   Procedures    US Carotid Bilateral     Standing Status:   Future     Expected Date:   2/17/2025     Expiration Date:   2/17/2026    CBC Auto Differential     Standing Status:   Future     Expected Date:   8/17/2025     Expiration Date:   2/17/2026    Comprehensive Metabolic Panel     Standing Status:   Future     Expected Date:   8/17/2025     Expiration Date:   2/17/2026    Lipid Panel     Standing Status:   Future     Expected Date:   8/17/2025     Expiration Date:   2/17/2026    TSH     Standing Status:   Future     Expected Date:   8/17/2025     Expiration Date:   2/17/2026    Ambulatory referral/consult to ENT     Standing Status:   Future     Expected Date:   2/24/2025     Expiration Date:   3/17/2026     Referral Priority:   Routine     Referral Type:   Consultation     Referral Reason:   Specialty Services Required     Referred to Provider:   Kimberly Altamirano  MD BILL     Requested Specialty:   Otolaryngology     Number of Visits Requested:   1        Medication List with Changes/Refills   Current Medications    ALFUZOSIN (UROXATRAL) 10 MG TB24    Take 10 mg by mouth once daily.       Start Date: 4/9/2022  End Date: --    AMITRIPTYLINE (ELAVIL) 10 MG TABLET    One half tab q hs prn insomnia       Start Date: 5/16/2024 End Date: --    ASPIRIN (ECOTRIN) 81 MG EC TABLET    Take 81 mg by mouth once daily.       Start Date: --        End Date: --    AZELASTINE (ASTELIN) 137 MCG (0.1 %) NASAL SPRAY    1 spray by Nasal route 2 (two) times daily.       Start Date: --        End Date: --    CHOLECALCIFEROL, VITAMIN D3, (D3-2000 ORAL)           Start Date: 5/25/2023 End Date: --    DONEPEZIL (ARICEPT) 10 MG TABLET    Take 1 tablet (10 mg total) by mouth every evening.       Start Date: 5/16/2024 End Date: 5/16/2025    FLUTICASONE PROPIONATE (FLONASE) 50 MCG/ACTUATION NASAL SPRAY    1 spray (50 mcg total) by Each Nostril route once daily.       Start Date: 8/16/2024 End Date: --    GLUCOSAMINE-CHONDROITIN 500-400 MG TABLET    Take 1 tablet by mouth Daily.       Start Date: --        End Date: --    LEVOTHYROXINE (SYNTHROID) 75 MCG TABLET    Take 1 tablet (75 mcg total) by mouth once daily.       Start Date: 5/16/2024 End Date: --    MULTIVIT-MINS/IRON/FOLIC/LYCOP (CENTRUM MEN ORAL)    Daily.       Start Date: 1/15/2024 End Date: --    OMEGA-3 FATTY ACIDS/FISH OIL (FISH OIL-OMEGA-3 FATTY ACIDS) 300-1,000 MG CAPSULE    Take by mouth once daily.       Start Date: --        End Date: --    TADALAFIL (CIALIS) 5 MG TABLET    Take 5 mg by mouth.       Start Date: 9/17/2024 End Date: --   Discontinued Medications    TAMSULOSIN (FLOMAX) 0.4 MG CAP    1 cap(s) orally once a day for 30 day(s)       Start Date: --        End Date: 2/17/2025            Follow up in about 6 months (around 8/17/2025). In addition to their scheduled follow up, the patient has also been instructed to follow up on as  needed basis.       Yonny Early

## 2025-02-21 ENCOUNTER — RESULTS FOLLOW-UP (OUTPATIENT)
Dept: INTERNAL MEDICINE | Facility: CLINIC | Age: 75
End: 2025-02-21
Payer: MEDICARE

## 2025-02-21 NOTE — TELEPHONE ENCOUNTER
----- Message from Yonny Early MD sent at 2/20/2025  4:57 PM CST -----  Tell him that has no significant stenosis of the carotid arteries.  No need to change any medications.  Follow-up as scheduled.  ----- Message -----  From: Interface, Rad Results In  Sent: 2/19/2025  10:26 AM CST  To: Yonny Early MD

## 2025-04-29 ENCOUNTER — LAB VISIT (OUTPATIENT)
Dept: LAB | Facility: HOSPITAL | Age: 75
End: 2025-04-29
Attending: NURSE PRACTITIONER
Payer: MEDICARE

## 2025-04-29 ENCOUNTER — OFFICE VISIT (OUTPATIENT)
Dept: HEMATOLOGY/ONCOLOGY | Facility: CLINIC | Age: 75
End: 2025-04-29
Payer: MEDICARE

## 2025-04-29 ENCOUNTER — PATIENT MESSAGE (OUTPATIENT)
Dept: HEMATOLOGY/ONCOLOGY | Facility: CLINIC | Age: 75
End: 2025-04-29

## 2025-04-29 VITALS
HEIGHT: 69 IN | RESPIRATION RATE: 15 BRPM | OXYGEN SATURATION: 98 % | BODY MASS INDEX: 26.96 KG/M2 | SYSTOLIC BLOOD PRESSURE: 122 MMHG | TEMPERATURE: 98 F | DIASTOLIC BLOOD PRESSURE: 81 MMHG | HEART RATE: 48 BPM | WEIGHT: 182 LBS

## 2025-04-29 DIAGNOSIS — C61 MALIGNANT NEOPLASM OF PROSTATE: ICD-10-CM

## 2025-04-29 DIAGNOSIS — C61 MALIGNANT NEOPLASM OF PROSTATE: Primary | ICD-10-CM

## 2025-04-29 LAB
ALBUMIN SERPL-MCNC: 4 G/DL (ref 3.4–4.8)
ALBUMIN/GLOB SERPL: 1.4 RATIO (ref 1.1–2)
ALP SERPL-CCNC: 78 UNIT/L (ref 40–150)
ALT SERPL-CCNC: 20 UNIT/L (ref 0–55)
ANION GAP SERPL CALC-SCNC: 7 MEQ/L
AST SERPL-CCNC: 24 UNIT/L (ref 11–45)
BASOPHILS # BLD AUTO: 0.02 X10(3)/MCL
BASOPHILS NFR BLD AUTO: 0.5 %
BILIRUB SERPL-MCNC: 0.7 MG/DL
BUN SERPL-MCNC: 14.6 MG/DL (ref 8.4–25.7)
CALCIUM SERPL-MCNC: 9.2 MG/DL (ref 8.8–10)
CHLORIDE SERPL-SCNC: 107 MMOL/L (ref 98–107)
CO2 SERPL-SCNC: 25 MMOL/L (ref 23–31)
CREAT SERPL-MCNC: 0.98 MG/DL (ref 0.72–1.25)
CREAT/UREA NIT SERPL: 15
EOSINOPHIL # BLD AUTO: 0.03 X10(3)/MCL (ref 0–0.9)
EOSINOPHIL NFR BLD AUTO: 0.7 %
ERYTHROCYTE [DISTWIDTH] IN BLOOD BY AUTOMATED COUNT: 13.4 % (ref 11.5–17)
GFR SERPLBLD CREATININE-BSD FMLA CKD-EPI: >60 ML/MIN/1.73/M2
GLOBULIN SER-MCNC: 2.9 GM/DL (ref 2.4–3.5)
GLUCOSE SERPL-MCNC: 92 MG/DL (ref 82–115)
HCT VFR BLD AUTO: 40.2 % (ref 42–52)
HGB BLD-MCNC: 13.3 G/DL (ref 14–18)
IMM GRANULOCYTES # BLD AUTO: 0.01 X10(3)/MCL (ref 0–0.04)
IMM GRANULOCYTES NFR BLD AUTO: 0.2 %
LYMPHOCYTES # BLD AUTO: 1.14 X10(3)/MCL (ref 0.6–4.6)
LYMPHOCYTES NFR BLD AUTO: 27.7 %
MCH RBC QN AUTO: 31.4 PG (ref 27–31)
MCHC RBC AUTO-ENTMCNC: 33.1 G/DL (ref 33–36)
MCV RBC AUTO: 95 FL (ref 80–94)
MONOCYTES # BLD AUTO: 0.64 X10(3)/MCL (ref 0.1–1.3)
MONOCYTES NFR BLD AUTO: 15.6 %
NEUTROPHILS # BLD AUTO: 2.27 X10(3)/MCL (ref 2.1–9.2)
NEUTROPHILS NFR BLD AUTO: 55.3 %
PLATELET # BLD AUTO: 218 X10(3)/MCL (ref 130–400)
PMV BLD AUTO: 10.8 FL (ref 7.4–10.4)
POTASSIUM SERPL-SCNC: 4.7 MMOL/L (ref 3.5–5.1)
PROT SERPL-MCNC: 6.9 GM/DL (ref 5.8–7.6)
PSA SERPL-MCNC: 0.3 NG/ML
RBC # BLD AUTO: 4.23 X10(6)/MCL (ref 4.7–6.1)
SODIUM SERPL-SCNC: 139 MMOL/L (ref 136–145)
WBC # BLD AUTO: 4.11 X10(3)/MCL (ref 4.5–11.5)

## 2025-04-29 PROCEDURE — 80053 COMPREHEN METABOLIC PANEL: CPT

## 2025-04-29 PROCEDURE — 84153 ASSAY OF PSA TOTAL: CPT

## 2025-04-29 PROCEDURE — 99213 OFFICE O/P EST LOW 20 MIN: CPT | Mod: S$PBB,,, | Performed by: NURSE PRACTITIONER

## 2025-04-29 PROCEDURE — 99214 OFFICE O/P EST MOD 30 MIN: CPT | Mod: PBBFAC | Performed by: NURSE PRACTITIONER

## 2025-04-29 PROCEDURE — 36415 COLL VENOUS BLD VENIPUNCTURE: CPT

## 2025-04-29 PROCEDURE — 99999 PR PBB SHADOW E&M-EST. PATIENT-LVL IV: CPT | Mod: PBBFAC,,, | Performed by: NURSE PRACTITIONER

## 2025-04-29 PROCEDURE — 85025 COMPLETE CBC W/AUTO DIFF WBC: CPT

## 2025-04-29 NOTE — PROGRESS NOTES
"Subjective:       Patient ID: Matthew Mejia is a 74 y.o. male.    Chief Complaint:  "I urinate frequently at night"    Diagnosis: Locally advanced, high-grade prostate cancer    Treatment History  XRT + Lupron completed 3/1/18  Zytiga/Prednisone 3/18-12/19 --> Lupron completed 9/19    Current Treatment: Observation    Clinical History:  Patient has a history of BPH with a mildly elevated PSA level for many years. He had previous benign prostate biopsies in 2008. Routine lab work 6/14/17 showed further increase in his PSA level to 7.5 ng/mL with a serum testosterone of 547 ng/dL. He was treated with antibiotics but his PSA level remained elevated. Ultrasound was abnormal. MRI of the prostate 8/18/17 showed a volume of 38 grams with a 2.2 cm aggressive midline peripheral zone lesion at the base and mid gland. There was obliteration of the right rectal prostatic angle at the mid gland consistent with extracapsular disease and encasement of the intra-prostatic seminal vesicle origins. There was no neurovascular bundle involvement. There was no abnormal pelvic adenopathy.    On 9/20/17, he underwent cystoscopy with bilateral retrogrades, bladder biopsies and ultrasound-guided prostate biopsies. Bladder biopsies showed chronic cystitis with cystitis cystica and no evidence of atypia. Prostate biopsies were positive for adenocarcinoma, Ugo's 4+4 = 8 bilaterally with perineural extension on the right. He was evaluated by Dr. Sid Puckett at [facility] for a surgical opinion 10/12/17. Given his extraprostatic extension, primary surgery was not recommended. A bone scan was recommended to complete staging followed by androgen deprivation therapy and EBRT. He was referred to Dr. Martínez for a Radiation Oncology opinion. He underwent a CT PET scan 10/17/17 which showed no abnormal hypermetabolic activity to indicate metastatic disease. Whole-body bone scan 10/19/17 was negative for abnormal osseous uptake. " He was started on Casodex 50 mg daily followed by Lupron prior to starting radiation therapy.    He was seen as a new patient at [facility] 10/30/17 for treatment recommendations. Treatment with Zytiga and prednisone in combination with Lupron was recommended for up to 2 years as adjuvant therapy based on results from the STAMPEDE trial showing significant improvement in failure free survival versus androgen deprivation therapy alone. Follow-up PSA level 8/16/18 was <0.01 ng/mL. He developed radiation proctitis requiring treatment with oral steroids. Colonoscopy 9/26/18 showed minor radiation scarring and no evidence of polyps. His prednisone was gradually tapered back to a maintenance dose of 5 mg daily in combination with his Zytiga. PSA level has remained undetectable.    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 He was evaluated by Ortho and referred for physical therapy with improvement in her symptoms. His Lupron was discontinued 9/26/19.  He completed his intended course of Zytiga and Prednisone 12/19.  PSA level has remained suppressed off of hormonal therapy with slow, very gradual increase.    Interval History   Mr. Mejia is here today by himself for at three month prostate cancer follow-up visit.  He is doing well overall.  He denies any recent illnesses or procedures.  He has chronic urinary frequency, especially at night, and this is very frustrating to him.  He recently established care with a new Urologist and was encouraged to schedule a follow-up appointment to discuss further.  Laboratory in the office today shows stable mild anemia and a stable PSA level of 0.3.  LFTs and alkaline phosphatase level is normal.      Review of Systems   Constitutional:  Negative for appetite change, fatigue, fever and unexpected weight change.   HENT:  Negative for mouth sores, sore throat and trouble  swallowing.    Eyes: Negative.  Negative for visual disturbance.   Respiratory:  Negative for cough and shortness of breath.    Cardiovascular:  Negative for chest pain, palpitations and leg swelling.   Gastrointestinal:  Negative for abdominal distention, abdominal pain, constipation, diarrhea and nausea.   Genitourinary:  Positive for difficulty urinating (Mild) and urgency (nocturia). Negative for dysuria, frequency and hematuria.        Decreased urinary stream  Chronic perineal pain, neuropathic   Musculoskeletal:  Positive for arthralgias and back pain.   Integumentary:  Negative for pallor and rash.   Neurological:  Negative for dizziness, weakness, numbness and headaches.   Hematological:  Negative for adenopathy. Does not bruise/bleed easily.   Psychiatric/Behavioral: Negative.  The patient is not nervous/anxious.         Poor short-term memory       PMHx:  BPH, squamous cell carcinoma of the scalp, shingles  PSHx: Prostate biopsy, carpal tunnel release, tonsillectomy, eye surgery, skin cancer scalp  SH:  Lifetime nonsmoker, + social alcohol use. Retired from the technology industry. Lives in [place] with his wife.  FH:  His father had colon cancer, mother had breast cancer. His paternal grandfather had stomach cancer.       Objective:     Vitals:    04/29/25 1008   BP: 122/81   Pulse: (!) 48   Resp: 15   Temp: 98.2 °F (36.8 °C)           Physical Exam  Constitutional:       Appearance: Normal appearance.      Comments: Elderly, well-developed white male in NAD.   HENT:      Head: Normocephalic.      Mouth/Throat:      Mouth: Mucous membranes are moist.      Pharynx: Oropharynx is clear. No posterior oropharyngeal erythema.   Eyes:      Extraocular Movements: Extraocular movements intact.      Conjunctiva/sclera: Conjunctivae normal.      Pupils: Pupils are equal, round, and reactive to light.   Cardiovascular:      Rate and Rhythm: Normal rate and regular rhythm.      Heart sounds: No murmur  heard.  Pulmonary:      Comments: Lungs clear to auscultation.  Abdominal:      General: Bowel sounds are normal. There is no distension.      Palpations: Abdomen is soft. There is no mass.      Tenderness: There is no abdominal tenderness.   Musculoskeletal:         General: No swelling or tenderness. Normal range of motion.      Cervical back: Neck supple. No tenderness.   Skin:     General: Skin is warm and dry.      Findings: No rash.   Neurological:      Mental Status: He is alert and oriented to person, place, and time.      Cranial Nerves: No cranial nerve deficit.      Motor: No weakness.   Psychiatric:         Mood and Affect: Mood normal.         Behavior: Behavior normal.      Comments: Mildly anxious       ECOG SCORE    1 - Restricted in strenuous activity-ambulatory and able to carry out work of a light nature             LABORATORY  Recent Results (from the past 2 weeks)   Comprehensive Metabolic Panel    Collection Time: 04/29/25  9:47 AM   Result Value Ref Range    Sodium 139 136 - 145 mmol/L    Potassium 4.7 3.5 - 5.1 mmol/L    Chloride 107 98 - 107 mmol/L    CO2 25 23 - 31 mmol/L    Glucose 92 82 - 115 mg/dL    Blood Urea Nitrogen 14.6 8.4 - 25.7 mg/dL    Creatinine 0.98 0.72 - 1.25 mg/dL    Calcium 9.2 8.8 - 10.0 mg/dL    Protein Total 6.9 5.8 - 7.6 gm/dL    Albumin 4.0 3.4 - 4.8 g/dL    Globulin 2.9 2.4 - 3.5 gm/dL    Albumin/Globulin Ratio 1.4 1.1 - 2.0 ratio    Bilirubin Total 0.7 <=1.5 mg/dL    ALP 78 40 - 150 unit/L    ALT 20 0 - 55 unit/L    AST 24 11 - 45 unit/L    eGFR >60 mL/min/1.73/m2    Anion Gap 7.0 mEq/L    BUN/Creatinine Ratio 15    PSA    Collection Time: 04/29/25  9:47 AM   Result Value Ref Range    Prostate Specific Antigen 0.30 <=4.00 ng/mL   CBC with Differential    Collection Time: 04/29/25  9:47 AM   Result Value Ref Range    WBC 4.11 (L) 4.50 - 11.50 x10(3)/mcL    RBC 4.23 (L) 4.70 - 6.10 x10(6)/mcL    Hgb 13.3 (L) 14.0 - 18.0 g/dL    Hct 40.2 (L) 42.0 - 52.0 %    MCV 95.0  (H) 80.0 - 94.0 fL    MCH 31.4 (H) 27.0 - 31.0 pg    MCHC 33.1 33.0 - 36.0 g/dL    RDW 13.4 11.5 - 17.0 %    Platelet 218 130 - 400 x10(3)/mcL    MPV 10.8 (H) 7.4 - 10.4 fL    Neut % 55.3 %    Lymph % 27.7 %    Mono % 15.6 %    Eos % 0.7 %    Basophil % 0.5 %    Imm Grans % 0.2 %    Neut # 2.27 2.1 - 9.2 x10(3)/mcL    Lymph # 1.14 0.6 - 4.6 x10(3)/mcL    Mono # 0.64 0.1 - 1.3 x10(3)/mcL    Eos # 0.03 0 - 0.9 x10(3)/mcL    Baso # 0.02 <=0.2 x10(3)/mcL    Imm Gran # 0.01 0.00 - 0.04 x10(3)/mcL                      1/10/23   4/17/23   7/20/23   10/20/23  1/29/24   6/12/24  10/3/24  01/10/25  4/29/25  PSA     0.35        0.31        0.23        0.27        0.27        0.52        0.28       0.36        0.3       Assessment:   Locally advanced high-grade prostate cancer - clinical T3 with extraprostatic extension and no evidence of metastatic disease  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  He has no clinical or laboratory findings worrisome for relapsed or progressive disease.  He was encouraged to follow-up with Urology regarding his nocturia.  Otherwise, he will follow-up in 3 months with repeat PSA and testosterone level.  Patient is in agreement.  All questions answered.    PARKER MORILLO, FNP-C  [facility]     Other Physicians  Dr. Yonny Harkins

## 2025-06-26 ENCOUNTER — TELEPHONE (OUTPATIENT)
Dept: INTERNAL MEDICINE | Facility: CLINIC | Age: 75
End: 2025-06-26
Payer: MEDICARE

## 2025-06-26 DIAGNOSIS — E03.9 HYPOTHYROIDISM, UNSPECIFIED TYPE: ICD-10-CM

## 2025-06-26 RX ORDER — LEVOTHYROXINE SODIUM 75 UG/1
75 TABLET ORAL DAILY
Qty: 90 TABLET | Refills: 3 | Status: SHIPPED | OUTPATIENT
Start: 2025-06-26

## 2025-06-26 NOTE — TELEPHONE ENCOUNTER
Copied from CRM #[id number]. Topic: Medications - Medication Refill  >> Jun 26, 2025  8:51 AM Ruma wrote:  Who Called: Matthew Mejia    Refill or New Rx:Refill  RX Name and Strength:levothyroxine (SYNTHROID) 75 MCG tablet  How is the patient currently taking it? (ex. 1XDay): Take 1 tablet (75 mcg total) by mouth once daily. - Oral  Is this a 30 day or 90 day RX:90  Local or Mail Order:local  List of preferred pharmacies on file (remove unneeded): [id number] - [place], [place] - [address] AT [facility]  Ordering Provider:Yonny Early MD      Preferred Method of Contact: Phone Call  Patient's Preferred Phone Number on File: [phone]   Best Call Back Number, if different:  Additional Information:

## 2025-07-12 NOTE — PROGRESS NOTES
Subjective:       Patient ID: Matthew Mejia is a 74 y.o. male.    Chief Complaint:  My memory is terrible    Diagnosis: Locally advanced, high-grade prostate cancer    Treatment History  XRT + Lupron completed 3/1/18  Zytiga/Prednisone 3/18-12/19 --> Lupron completed 9/19    Current Treatment: Observation    Clinical History:  Patient has a history of BPH with a mildly elevated PSA level for many years. He had previous benign prostate biopsies in 2008. Routine lab work 6/14/17 showed further increase in his PSA level to 7.5 ng/mL with a serum testosterone of 547 ng/dL. He was treated with antibiotics but his PSA level remained elevated. Ultrasound was abnormal. MRI of the prostate 8/18/17 showed a volume of 38 grams with a 2.2 cm aggressive midline peripheral zone lesion at the base and mid gland. There was obliteration of the right rectal prostatic angle at the mid gland consistent with extracapsular disease and encasement of the intra-prostatic seminal vesicle origins. There was no neurovascular bundle involvement. There was no abnormal pelvic adenopathy.    On 9/20/17, he underwent cystoscopy with bilateral retrogrades, bladder biopsies and ultrasound-guided prostate biopsies. Bladder biopsies showed chronic cystitis with cystitis cystica and no evidence of atypia. Prostate biopsies were positive for adenocarcinoma, Ugo's 4+4 = 8 bilaterally with perineural extension on the right. He was evaluated by Dr. Sid Puckett at [facility] for a surgical opinion 10/12/17. Given his extraprostatic extension, primary surgery was not recommended. A bone scan was recommended to complete staging followed by androgen deprivation therapy and EBRT. He was referred to Dr. Martínez for a Radiation Oncology opinion. He underwent a CT PET scan 10/17/17 which showed no abnormal hypermetabolic activity to indicate metastatic disease. Whole-body bone scan 10/19/17 was negative for abnormal osseous uptake. He was  started on Casodex 50 mg daily followed by Lupron prior to starting radiation therapy.    He was seen as a new patient [facility] 10/30/17 for treatment recommendations. Treatment with Zytiga and prednisone in combination with Lupron was recommended for up to 2 years as adjuvant therapy based on results from the STAMPEDE trial showing significant improvement in failure free survival versus androgen deprivation therapy alone. Follow-up PSA level 8/16/18 was <0.01 ng/mL. He developed radiation proctitis requiring treatment with oral steroids. Colonoscopy 9/26/18 showed minor radiation scarring and no evidence of polyps. His prednisone was gradually tapered back to a maintenance dose of 5 mg daily in combination with his Zytiga. PSA level has remained undetectable.    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 He was evaluated by Ortho and referred for physical therapy with improvement in her symptoms. His Lupron was discontinued 9/26/19.  He completed his intended course of Zytiga and Prednisone 12/19.  PSA level has remained suppressed off of hormonal therapy with slow, very gradual increase.    Interval History   He returns to the office today by himself for a three-month follow-up visit.  He established follow-up with a new Urologist following the [facility] of Dr. Yen.  He complained of chronic urinary frequency and decreased stream.  He underwent an outpatient cystoscopy with no significant findings.  He was given a trial of Gemtesa which helped his symptoms.  He continues to complain of poor memory which aggravates him.  He took Aricept in the past without any benefit.  Laboratory testing shows a stable PSA level of 0.27 ng/mL with a testosterone level of 438 ng/dL.      Review of Systems   Constitutional:  Negative for appetite change, fatigue, fever and unexpected weight change.   HENT:  Negative  for mouth sores, sore throat and trouble swallowing.    Eyes: Negative.    Respiratory:  Negative for cough and shortness of breath.    Cardiovascular:  Negative for chest pain, palpitations and leg swelling.   Gastrointestinal:  Negative for abdominal distention, abdominal pain, constipation, diarrhea and nausea.   Genitourinary:  Positive for difficulty urinating (Mild) and frequency. Negative for dysuria and flank pain.        Decreased urinary stream  Chronic perineal pain, neuropathic   Musculoskeletal:  Positive for arthralgias and back pain.   Integumentary:  Negative for pallor and rash.   Neurological:  Negative for dizziness, weakness, numbness and headaches.   Hematological:  Negative for adenopathy. Does not bruise/bleed easily.   Psychiatric/Behavioral: Negative.          Poor short-term memory       PMHx:  BPH, squamous cell carcinoma of the scalp, shingles  PSHx: Prostate biopsy, carpal tunnel release, tonsillectomy, eye surgery, skin cancer scalp  SH:  Lifetime nonsmoker, + social alcohol use. Retired from the technology industry. Lives in [place] with his wife.  FH:  His father had colon cancer, mother had breast cancer. His paternal grandfather had stomach cancer.       Objective:     Vitals:    07/29/25 1003   BP: 133/76   Pulse: (!) 48   Resp: 15   Temp: 98 °F (36.7 °C)       Physical Exam  Constitutional:       Appearance: Normal appearance.      Comments: Elderly, well-developed white male in NAD.   HENT:      Head: Normocephalic.      Mouth/Throat:      Mouth: Mucous membranes are moist.      Pharynx: Oropharynx is clear. No posterior oropharyngeal erythema.   Eyes:      Extraocular Movements: Extraocular movements intact.      Conjunctiva/sclera: Conjunctivae normal.      Pupils: Pupils are equal, round, and reactive to light.   Cardiovascular:      Rate and Rhythm: Normal rate and regular rhythm.      Heart sounds: No murmur heard.  Pulmonary:      Comments: Lungs clear to  auscultation.  Abdominal:      General: Bowel sounds are normal. There is no distension.      Palpations: Abdomen is soft. There is no mass.      Tenderness: There is no abdominal tenderness.   Musculoskeletal:         General: No swelling or tenderness. Normal range of motion.      Cervical back: Neck supple. No tenderness.   Skin:     General: Skin is warm and dry.      Findings: No rash.   Neurological:      Mental Status: He is alert and oriented to person, place, and time.      Cranial Nerves: No cranial nerve deficit.      Motor: No weakness.       ECOG SCORE    1 - Restricted in strenuous activity-ambulatory and able to carry out work of a light nature             LABORATORY  Recent Results (from the past 2 weeks)   Comprehensive Metabolic Panel    Collection Time: 07/22/25 11:28 AM   Result Value Ref Range    Sodium 139 136 - 145 mmol/L    Potassium 4.3 3.5 - 5.1 mmol/L    Chloride 110 (H) 98 - 107 mmol/L    CO2 23 23 - 31 mmol/L    Glucose 101 82 - 115 mg/dL    Blood Urea Nitrogen 15.0 8.4 - 25.7 mg/dL    Creatinine 0.89 0.72 - 1.25 mg/dL    Calcium 8.9 8.8 - 10.0 mg/dL    Protein Total 6.6 5.8 - 7.6 gm/dL    Albumin 3.9 3.4 - 4.8 g/dL    Globulin 2.7 2.4 - 3.5 gm/dL    Albumin/Globulin Ratio 1.4 1.1 - 2.0 ratio    Bilirubin Total 0.6 <=1.5 mg/dL    ALP 68 40 - 150 unit/L    ALT 20 0 - 55 unit/L    AST 23 11 - 45 unit/L    eGFR >60 mL/min/1.73/m2    Anion Gap 6.0 mEq/L    BUN/Creatinine Ratio 17    PSA    Collection Time: 07/22/25 11:28 AM   Result Value Ref Range    Prostate Specific Antigen 0.27 <=4.00 ng/mL   Testosterone    Collection Time: 07/22/25 11:28 AM   Result Value Ref Range    Testosterone Total 438.68 220.91 - 715.81 ng/dL   CBC with Differential    Collection Time: 07/22/25 11:28 AM   Result Value Ref Range    WBC 3.89 (L) 4.50 - 11.50 x10(3)/mcL    RBC 4.08 (L) 4.70 - 6.10 x10(6)/mcL    Hgb 12.9 (L) 14.0 - 18.0 g/dL    Hct 38.8 (L) 42.0 - 52.0 %    MCV 95.1 (H) 80.0 - 94.0 fL    MCH 31.6 (H)  27.0 - 31.0 pg    MCHC 33.2 33.0 - 36.0 g/dL    RDW 13.1 11.5 - 17.0 %    Platelet 200 130 - 400 x10(3)/mcL    MPV 11.0 (H) 7.4 - 10.4 fL    Neut % 53.5 %    Lymph % 29.6 %    Mono % 15.9 %    Eos % 0.5 %    Basophil % 0.5 %    Imm Grans % 0.0 %    Neut # 2.08 (L) 2.1 - 9.2 x10(3)/mcL    Lymph # 1.15 0.6 - 4.6 x10(3)/mcL    Mono # 0.62 0.1 - 1.3 x10(3)/mcL    Eos # 0.02 0 - 0.9 x10(3)/mcL    Baso # 0.02 <=0.2 x10(3)/mcL    Imm Gran # 0.00 0.00 - 0.04 x10(3)/mcL                    1/10/23   4/17/23   7/20/23   10/20/23  1/29/24   6/12/24  10/3/24  01/10/25  4/29/25   7/22/25  PSA     0.35        0.31        0.23        0.27        0.27        0.52        0.28       0.36        0.3          0.27       Assessment:   Locally advanced high-grade prostate cancer - clinical T3 with extraprostatic extension and no evidence of metastatic disease  Chronic neuropathic pain involving perineum and scrotum  Poor short term memory      Plan:   PSA level remains stable and shows no significant progression over the past 2 years.  Consider PSMA PET scan if PSA >1.0 ng/mL.  RTC in 3-4 months for a follow-up visit with repeat laboratory.      EDEN SANTIAGO MD    Other Physicians  Dr. Eden Harkins

## 2025-07-17 ENCOUNTER — TELEPHONE (OUTPATIENT)
Dept: INTERNAL MEDICINE | Facility: CLINIC | Age: 75
End: 2025-07-17
Payer: MEDICARE

## 2025-07-17 NOTE — TELEPHONE ENCOUNTER
Copied from CRM #[id number]. Topic: Medications - Medication Refill  >> Jul 17, 2025  9:54 AM Cara wrote:  Who Called: Matthew Mejia    Refill or New Rx:Refill  RX Name and Strength: donepeziL (ARICEPT) 10 MG tablet  How is the patient currently taking it? (ex. 1XDay):   Is this a 30 day or 90 day RX:   Local or Mail Order: [facility] #[id number] - [place], [place] - [address] AT [facility]  List of preferred pharmacies on file (remove unneeded): [unfilled]  If different Pharmacy is requested, enter Pharmacy information here including location and phone number:     Ordering Provider:       Preferred Method of Contact: Phone Call  Patient's Preferred Phone Number on File: [phone]   Best Call Back Number, if different:  Additional Information: pt would like to speak with nurse as well , pt has question

## 2025-07-22 ENCOUNTER — LAB VISIT (OUTPATIENT)
Dept: LAB | Facility: HOSPITAL | Age: 75
End: 2025-07-22
Attending: INTERNAL MEDICINE
Payer: MEDICARE

## 2025-07-22 DIAGNOSIS — C61 MALIGNANT NEOPLASM OF PROSTATE: ICD-10-CM

## 2025-07-22 LAB
ALBUMIN SERPL-MCNC: 3.9 G/DL (ref 3.4–4.8)
ALBUMIN/GLOB SERPL: 1.4 RATIO (ref 1.1–2)
ALP SERPL-CCNC: 68 UNIT/L (ref 40–150)
ALT SERPL-CCNC: 20 UNIT/L (ref 0–55)
ANION GAP SERPL CALC-SCNC: 6 MEQ/L
AST SERPL-CCNC: 23 UNIT/L (ref 11–45)
BASOPHILS # BLD AUTO: 0.02 X10(3)/MCL
BASOPHILS NFR BLD AUTO: 0.5 %
BILIRUB SERPL-MCNC: 0.6 MG/DL
BUN SERPL-MCNC: 15 MG/DL (ref 8.4–25.7)
CALCIUM SERPL-MCNC: 8.9 MG/DL (ref 8.8–10)
CHLORIDE SERPL-SCNC: 110 MMOL/L (ref 98–107)
CO2 SERPL-SCNC: 23 MMOL/L (ref 23–31)
CREAT SERPL-MCNC: 0.89 MG/DL (ref 0.72–1.25)
CREAT/UREA NIT SERPL: 17
EOSINOPHIL # BLD AUTO: 0.02 X10(3)/MCL (ref 0–0.9)
EOSINOPHIL NFR BLD AUTO: 0.5 %
ERYTHROCYTE [DISTWIDTH] IN BLOOD BY AUTOMATED COUNT: 13.1 % (ref 11.5–17)
GFR SERPLBLD CREATININE-BSD FMLA CKD-EPI: >60 ML/MIN/1.73/M2
GLOBULIN SER-MCNC: 2.7 GM/DL (ref 2.4–3.5)
GLUCOSE SERPL-MCNC: 101 MG/DL (ref 82–115)
HCT VFR BLD AUTO: 38.8 % (ref 42–52)
HGB BLD-MCNC: 12.9 G/DL (ref 14–18)
IMM GRANULOCYTES # BLD AUTO: 0 X10(3)/MCL (ref 0–0.04)
IMM GRANULOCYTES NFR BLD AUTO: 0 %
LYMPHOCYTES # BLD AUTO: 1.15 X10(3)/MCL (ref 0.6–4.6)
LYMPHOCYTES NFR BLD AUTO: 29.6 %
MCH RBC QN AUTO: 31.6 PG (ref 27–31)
MCHC RBC AUTO-ENTMCNC: 33.2 G/DL (ref 33–36)
MCV RBC AUTO: 95.1 FL (ref 80–94)
MONOCYTES # BLD AUTO: 0.62 X10(3)/MCL (ref 0.1–1.3)
MONOCYTES NFR BLD AUTO: 15.9 %
NEUTROPHILS # BLD AUTO: 2.08 X10(3)/MCL (ref 2.1–9.2)
NEUTROPHILS NFR BLD AUTO: 53.5 %
PLATELET # BLD AUTO: 200 X10(3)/MCL (ref 130–400)
PMV BLD AUTO: 11 FL (ref 7.4–10.4)
POTASSIUM SERPL-SCNC: 4.3 MMOL/L (ref 3.5–5.1)
PROT SERPL-MCNC: 6.6 GM/DL (ref 5.8–7.6)
PSA SERPL-MCNC: 0.27 NG/ML
RBC # BLD AUTO: 4.08 X10(6)/MCL (ref 4.7–6.1)
SODIUM SERPL-SCNC: 139 MMOL/L (ref 136–145)
TESTOST SERPL-MCNC: 438.68 NG/DL (ref 220.91–715.81)
WBC # BLD AUTO: 3.89 X10(3)/MCL (ref 4.5–11.5)

## 2025-07-22 PROCEDURE — 84153 ASSAY OF PSA TOTAL: CPT

## 2025-07-22 PROCEDURE — 80053 COMPREHEN METABOLIC PANEL: CPT

## 2025-07-22 PROCEDURE — 85025 COMPLETE CBC W/AUTO DIFF WBC: CPT

## 2025-07-22 PROCEDURE — 36415 COLL VENOUS BLD VENIPUNCTURE: CPT

## 2025-07-22 PROCEDURE — 84403 ASSAY OF TOTAL TESTOSTERONE: CPT

## 2025-07-23 ENCOUNTER — TELEPHONE (OUTPATIENT)
Dept: INTERNAL MEDICINE | Facility: CLINIC | Age: 75
End: 2025-07-23
Payer: MEDICARE

## 2025-07-23 NOTE — TELEPHONE ENCOUNTER
Patient stated he was calling to find out when his next appt with Dr. Early was scheduled.  Patient advised, verbalized understanding.

## 2025-07-23 NOTE — TELEPHONE ENCOUNTER
Copied from CRM #[id number]. Topic: General Inquiry - Return Call  >> Jul 22, 2025  4:54 PM Adry wrote:  ..Who Called: Matthew Mejia    Patient is returning phone call    Who Left Message for Patient: SANDRA Silva    Does the patient know what this is regarding?: N/A    Preferred Method of Contact: Phone Call    Patient's Preferred Phone Number on File: [phone]     Best Call Back Number, if different:    Additional Information: Pt returning missed call. Please advise, thank you.

## 2025-07-29 ENCOUNTER — OFFICE VISIT (OUTPATIENT)
Dept: HEMATOLOGY/ONCOLOGY | Facility: CLINIC | Age: 75
End: 2025-07-29
Payer: MEDICARE

## 2025-07-29 VITALS
WEIGHT: 184.06 LBS | HEIGHT: 69 IN | RESPIRATION RATE: 15 BRPM | BODY MASS INDEX: 27.26 KG/M2 | SYSTOLIC BLOOD PRESSURE: 133 MMHG | TEMPERATURE: 98 F | OXYGEN SATURATION: 98 % | DIASTOLIC BLOOD PRESSURE: 76 MMHG | HEART RATE: 48 BPM

## 2025-07-29 DIAGNOSIS — C61 MALIGNANT NEOPLASM OF PROSTATE: Primary | ICD-10-CM

## 2025-07-29 PROCEDURE — 99999 PR PBB SHADOW E&M-EST. PATIENT-LVL IV: CPT | Mod: PBBFAC,,, | Performed by: INTERNAL MEDICINE

## 2025-07-29 PROCEDURE — 99214 OFFICE O/P EST MOD 30 MIN: CPT | Mod: PBBFAC | Performed by: INTERNAL MEDICINE

## 2025-07-29 PROCEDURE — 99214 OFFICE O/P EST MOD 30 MIN: CPT | Mod: S$PBB,,, | Performed by: INTERNAL MEDICINE

## 2025-07-29 RX ORDER — VIBEGRON 75 MG/1
1 TABLET, FILM COATED ORAL
COMMUNITY
Start: 2025-07-29

## 2025-08-14 ENCOUNTER — TELEPHONE (OUTPATIENT)
Dept: INTERNAL MEDICINE | Facility: CLINIC | Age: 75
End: 2025-08-14
Payer: MEDICARE

## 2025-08-19 ENCOUNTER — TELEPHONE (OUTPATIENT)
Dept: INTERNAL MEDICINE | Facility: CLINIC | Age: 75
End: 2025-08-19
Payer: MEDICARE

## 2025-08-25 ENCOUNTER — LAB VISIT (OUTPATIENT)
Dept: LAB | Facility: HOSPITAL | Age: 75
End: 2025-08-25
Attending: INTERNAL MEDICINE
Payer: MEDICARE

## 2025-08-25 DIAGNOSIS — R41.3 MEMORY LOSS: ICD-10-CM

## 2025-08-25 DIAGNOSIS — R73.9 HYPERGLYCEMIA: ICD-10-CM

## 2025-08-25 DIAGNOSIS — C61 MALIGNANT NEOPLASM OF PROSTATE: ICD-10-CM

## 2025-08-25 DIAGNOSIS — I67.2 CEREBRAL ATHEROSCLEROSIS: ICD-10-CM

## 2025-08-25 DIAGNOSIS — E78.5 HYPERLIPIDEMIA, UNSPECIFIED HYPERLIPIDEMIA TYPE: ICD-10-CM

## 2025-08-25 DIAGNOSIS — E03.9 HYPOTHYROIDISM, UNSPECIFIED TYPE: ICD-10-CM

## 2025-08-25 LAB
ALBUMIN SERPL-MCNC: 3.8 G/DL (ref 3.4–4.8)
ALBUMIN/GLOB SERPL: 1.6 RATIO (ref 1.1–2)
ALP SERPL-CCNC: 70 UNIT/L (ref 40–150)
ALT SERPL-CCNC: 18 UNIT/L (ref 0–55)
ANION GAP SERPL CALC-SCNC: 5 MEQ/L
AST SERPL-CCNC: 18 UNIT/L (ref 11–45)
BASOPHILS # BLD AUTO: 0.01 X10(3)/MCL
BASOPHILS NFR BLD AUTO: 0.3 %
BILIRUB SERPL-MCNC: 0.6 MG/DL
BUN SERPL-MCNC: 14.1 MG/DL (ref 8.4–25.7)
CALCIUM SERPL-MCNC: 9 MG/DL (ref 8.8–10)
CHLORIDE SERPL-SCNC: 111 MMOL/L (ref 98–107)
CHOLEST SERPL-MCNC: 214 MG/DL
CHOLEST/HDLC SERPL: 4 {RATIO} (ref 0–5)
CO2 SERPL-SCNC: 26 MMOL/L (ref 23–31)
CREAT SERPL-MCNC: 0.85 MG/DL (ref 0.72–1.25)
CREAT/UREA NIT SERPL: 17
EOSINOPHIL # BLD AUTO: 0.04 X10(3)/MCL (ref 0–0.9)
EOSINOPHIL NFR BLD AUTO: 1 %
ERYTHROCYTE [DISTWIDTH] IN BLOOD BY AUTOMATED COUNT: 13.5 % (ref 11.5–17)
GFR SERPLBLD CREATININE-BSD FMLA CKD-EPI: >60 ML/MIN/1.73/M2
GLOBULIN SER-MCNC: 2.4 GM/DL (ref 2.4–3.5)
GLUCOSE SERPL-MCNC: 98 MG/DL (ref 82–115)
HCT VFR BLD AUTO: 40.8 % (ref 42–52)
HDLC SERPL-MCNC: 61 MG/DL (ref 35–60)
HGB BLD-MCNC: 13.4 G/DL (ref 14–18)
IMM GRANULOCYTES # BLD AUTO: 0.01 X10(3)/MCL (ref 0–0.04)
IMM GRANULOCYTES NFR BLD AUTO: 0.3 %
LDLC SERPL CALC-MCNC: 140 MG/DL (ref 50–140)
LYMPHOCYTES # BLD AUTO: 1 X10(3)/MCL (ref 0.6–4.6)
LYMPHOCYTES NFR BLD AUTO: 26.2 %
MCH RBC QN AUTO: 31.5 PG (ref 27–31)
MCHC RBC AUTO-ENTMCNC: 32.8 G/DL (ref 33–36)
MCV RBC AUTO: 95.8 FL (ref 80–94)
MONOCYTES # BLD AUTO: 0.63 X10(3)/MCL (ref 0.1–1.3)
MONOCYTES NFR BLD AUTO: 16.5 %
NEUTROPHILS # BLD AUTO: 2.13 X10(3)/MCL (ref 2.1–9.2)
NEUTROPHILS NFR BLD AUTO: 55.7 %
NRBC BLD AUTO-RTO: 0 %
PLATELET # BLD AUTO: 209 X10(3)/MCL (ref 130–400)
PMV BLD AUTO: 11.3 FL (ref 7.4–10.4)
POTASSIUM SERPL-SCNC: 4.4 MMOL/L (ref 3.5–5.1)
PROT SERPL-MCNC: 6.2 GM/DL (ref 5.8–7.6)
RBC # BLD AUTO: 4.26 X10(6)/MCL (ref 4.7–6.1)
SODIUM SERPL-SCNC: 142 MMOL/L (ref 136–145)
TRIGL SERPL-MCNC: 64 MG/DL (ref 34–140)
TSH SERPL-ACNC: 0.42 UIU/ML (ref 0.35–4.94)
VLDLC SERPL CALC-MCNC: 13 MG/DL
WBC # BLD AUTO: 3.82 X10(3)/MCL (ref 4.5–11.5)

## 2025-08-25 PROCEDURE — 84443 ASSAY THYROID STIM HORMONE: CPT

## 2025-08-25 PROCEDURE — 36415 COLL VENOUS BLD VENIPUNCTURE: CPT

## 2025-08-25 PROCEDURE — 80061 LIPID PANEL: CPT

## 2025-08-25 PROCEDURE — 80053 COMPREHEN METABOLIC PANEL: CPT

## 2025-08-25 PROCEDURE — 85025 COMPLETE CBC W/AUTO DIFF WBC: CPT

## 2025-08-26 ENCOUNTER — OFFICE VISIT (OUTPATIENT)
Dept: INTERNAL MEDICINE | Facility: CLINIC | Age: 75
End: 2025-08-26
Payer: MEDICARE

## 2025-08-26 ENCOUNTER — TELEPHONE (OUTPATIENT)
Dept: INTERNAL MEDICINE | Facility: CLINIC | Age: 75
End: 2025-08-26

## 2025-08-26 VITALS
SYSTOLIC BLOOD PRESSURE: 124 MMHG | OXYGEN SATURATION: 97 % | RESPIRATION RATE: 18 BRPM | DIASTOLIC BLOOD PRESSURE: 62 MMHG | HEIGHT: 69 IN | WEIGHT: 186 LBS | HEART RATE: 50 BPM | BODY MASS INDEX: 27.55 KG/M2

## 2025-08-26 DIAGNOSIS — R73.9 HYPERGLYCEMIA: ICD-10-CM

## 2025-08-26 DIAGNOSIS — R41.3 MEMORY LOSS: Primary | ICD-10-CM

## 2025-08-26 DIAGNOSIS — E78.5 HYPERLIPIDEMIA, UNSPECIFIED HYPERLIPIDEMIA TYPE: ICD-10-CM

## 2025-08-26 DIAGNOSIS — N39.0 URINARY TRACT INFECTION WITH HEMATURIA, SITE UNSPECIFIED: Primary | ICD-10-CM

## 2025-08-26 DIAGNOSIS — R41.3 MEMORY LOSS: ICD-10-CM

## 2025-08-26 DIAGNOSIS — R31.9 HEMATURIA, UNSPECIFIED TYPE: ICD-10-CM

## 2025-08-26 DIAGNOSIS — C61 MALIGNANT NEOPLASM OF PROSTATE: ICD-10-CM

## 2025-08-26 DIAGNOSIS — E03.9 HYPOTHYROIDISM, UNSPECIFIED TYPE: Primary | ICD-10-CM

## 2025-08-26 DIAGNOSIS — R31.9 HEMATURIA, UNSPECIFIED TYPE: Primary | ICD-10-CM

## 2025-08-26 DIAGNOSIS — R31.9 URINARY TRACT INFECTION WITH HEMATURIA, SITE UNSPECIFIED: Primary | ICD-10-CM

## 2025-08-26 LAB
BACTERIA #/AREA URNS AUTO: ABNORMAL /HPF
BILIRUB UR QL STRIP.AUTO: NEGATIVE
CLARITY UR: CLEAR
COLOR UR AUTO: COLORLESS
GLUCOSE UR QL STRIP: NORMAL
HGB UR QL STRIP: ABNORMAL
KETONES UR QL STRIP: NEGATIVE
LEUKOCYTE ESTERASE UR QL STRIP: NEGATIVE
NITRITE UR QL STRIP: NEGATIVE
PH UR STRIP: 6 [PH]
PROT UR QL STRIP: NEGATIVE
RBC #/AREA URNS AUTO: >100 /HPF
SP GR UR STRIP.AUTO: 1.01 (ref 1–1.03)
SQUAMOUS #/AREA URNS LPF: ABNORMAL /HPF
UROBILINOGEN UR STRIP-ACNC: NORMAL
WBC #/AREA URNS AUTO: ABNORMAL /HPF

## 2025-08-26 RX ORDER — ALFUZOSIN HYDROCHLORIDE 10 MG/1
10 TABLET, EXTENDED RELEASE ORAL NIGHTLY
COMMUNITY
Start: 2025-08-20

## 2025-08-26 RX ORDER — CIPROFLOXACIN 500 MG/1
500 TABLET, FILM COATED ORAL 2 TIMES DAILY
Qty: 14 TABLET | Refills: 0 | Status: SHIPPED | OUTPATIENT
Start: 2025-08-26 | End: 2025-09-02

## 2025-08-26 RX ORDER — ACETAMINOPHEN, DIPHENHYDRAMINE HCL, PHENYLEPHRINE HCL 325; 25; 5 MG/1; MG/1; MG/1
10 TABLET ORAL NIGHTLY
COMMUNITY

## 2025-08-26 RX ORDER — DONEPEZIL HYDROCHLORIDE 10 MG/1
10 TABLET, FILM COATED ORAL NIGHTLY
COMMUNITY

## 2025-08-26 RX ORDER — TROSPIUM CHLORIDE 20 MG/1
20 TABLET, FILM COATED ORAL DAILY
COMMUNITY
Start: 2025-07-29